# Patient Record
Sex: MALE | Race: WHITE | NOT HISPANIC OR LATINO | URBAN - METROPOLITAN AREA
[De-identification: names, ages, dates, MRNs, and addresses within clinical notes are randomized per-mention and may not be internally consistent; named-entity substitution may affect disease eponyms.]

---

## 2023-01-03 ENCOUNTER — LAB VISIT (OUTPATIENT)
Dept: LAB | Facility: HOSPITAL | Age: 28
End: 2023-01-03
Attending: FAMILY MEDICINE
Payer: COMMERCIAL

## 2023-01-03 ENCOUNTER — OFFICE VISIT (OUTPATIENT)
Dept: INTERNAL MEDICINE | Facility: CLINIC | Age: 28
End: 2023-01-03
Payer: COMMERCIAL

## 2023-01-03 VITALS
HEART RATE: 99 BPM | WEIGHT: 201.5 LBS | OXYGEN SATURATION: 98 % | DIASTOLIC BLOOD PRESSURE: 80 MMHG | TEMPERATURE: 99 F | SYSTOLIC BLOOD PRESSURE: 120 MMHG

## 2023-01-03 DIAGNOSIS — G47.00 INSOMNIA, UNSPECIFIED TYPE: ICD-10-CM

## 2023-01-03 DIAGNOSIS — B00.1 COLD SORE: Primary | ICD-10-CM

## 2023-01-03 DIAGNOSIS — M25.60 JOINT STIFFNESS: ICD-10-CM

## 2023-01-03 DIAGNOSIS — R41.840 CONCENTRATION DEFICIT: ICD-10-CM

## 2023-01-03 DIAGNOSIS — F32.A ANXIETY AND DEPRESSION: ICD-10-CM

## 2023-01-03 DIAGNOSIS — G47.19 EXCESSIVE DAYTIME SLEEPINESS: ICD-10-CM

## 2023-01-03 DIAGNOSIS — R45.89 COMPLAINT OF FEELING DEPRESSED: ICD-10-CM

## 2023-01-03 DIAGNOSIS — F41.9 ANXIETY AND DEPRESSION: ICD-10-CM

## 2023-01-03 DIAGNOSIS — J34.2 ACQUIRED DEVIATED NASAL SEPTUM: ICD-10-CM

## 2023-01-03 LAB
ALBUMIN SERPL BCP-MCNC: 4.3 G/DL (ref 3.5–5.2)
ALP SERPL-CCNC: 37 U/L (ref 55–135)
ALT SERPL W/O P-5'-P-CCNC: 16 U/L (ref 10–44)
ANION GAP SERPL CALC-SCNC: 9 MMOL/L (ref 8–16)
AST SERPL-CCNC: 12 U/L (ref 10–40)
BILIRUB SERPL-MCNC: 0.6 MG/DL (ref 0.1–1)
BUN SERPL-MCNC: 11 MG/DL (ref 6–20)
CALCIUM SERPL-MCNC: 9.7 MG/DL (ref 8.7–10.5)
CHLORIDE SERPL-SCNC: 107 MMOL/L (ref 95–110)
CO2 SERPL-SCNC: 24 MMOL/L (ref 23–29)
CREAT SERPL-MCNC: 0.9 MG/DL (ref 0.5–1.4)
CRP SERPL-MCNC: 1.3 MG/L (ref 0–8.2)
ERYTHROCYTE [SEDIMENTATION RATE] IN BLOOD BY PHOTOMETRIC METHOD: 5 MM/HR (ref 0–23)
EST. GFR  (NO RACE VARIABLE): >60 ML/MIN/1.73 M^2
GLUCOSE SERPL-MCNC: 102 MG/DL (ref 70–110)
POTASSIUM SERPL-SCNC: 4.3 MMOL/L (ref 3.5–5.1)
PROT SERPL-MCNC: 7.4 G/DL (ref 6–8.4)
SODIUM SERPL-SCNC: 140 MMOL/L (ref 136–145)

## 2023-01-03 PROCEDURE — 3074F PR MOST RECENT SYSTOLIC BLOOD PRESSURE < 130 MM HG: ICD-10-PCS | Mod: CPTII,S$GLB,, | Performed by: FAMILY MEDICINE

## 2023-01-03 PROCEDURE — 99204 PR OFFICE/OUTPT VISIT, NEW, LEVL IV, 45-59 MIN: ICD-10-PCS | Mod: S$GLB,,, | Performed by: FAMILY MEDICINE

## 2023-01-03 PROCEDURE — 1159F PR MEDICATION LIST DOCUMENTED IN MEDICAL RECORD: ICD-10-PCS | Mod: CPTII,S$GLB,, | Performed by: FAMILY MEDICINE

## 2023-01-03 PROCEDURE — 84443 ASSAY THYROID STIM HORMONE: CPT | Performed by: FAMILY MEDICINE

## 2023-01-03 PROCEDURE — 3079F DIAST BP 80-89 MM HG: CPT | Mod: CPTII,S$GLB,, | Performed by: FAMILY MEDICINE

## 2023-01-03 PROCEDURE — 3079F PR MOST RECENT DIASTOLIC BLOOD PRESSURE 80-89 MM HG: ICD-10-PCS | Mod: CPTII,S$GLB,, | Performed by: FAMILY MEDICINE

## 2023-01-03 PROCEDURE — 3074F SYST BP LT 130 MM HG: CPT | Mod: CPTII,S$GLB,, | Performed by: FAMILY MEDICINE

## 2023-01-03 PROCEDURE — 99999 PR PBB SHADOW E&M-NEW PATIENT-LVL IV: ICD-10-PCS | Mod: PBBFAC,,, | Performed by: FAMILY MEDICINE

## 2023-01-03 PROCEDURE — 99999 PR PBB SHADOW E&M-NEW PATIENT-LVL IV: CPT | Mod: PBBFAC,,, | Performed by: FAMILY MEDICINE

## 2023-01-03 PROCEDURE — 86038 ANTINUCLEAR ANTIBODIES: CPT | Performed by: FAMILY MEDICINE

## 2023-01-03 PROCEDURE — 85025 COMPLETE CBC W/AUTO DIFF WBC: CPT | Performed by: FAMILY MEDICINE

## 2023-01-03 PROCEDURE — 86140 C-REACTIVE PROTEIN: CPT | Performed by: FAMILY MEDICINE

## 2023-01-03 PROCEDURE — 36415 COLL VENOUS BLD VENIPUNCTURE: CPT | Performed by: FAMILY MEDICINE

## 2023-01-03 PROCEDURE — 1159F MED LIST DOCD IN RCRD: CPT | Mod: CPTII,S$GLB,, | Performed by: FAMILY MEDICINE

## 2023-01-03 PROCEDURE — 99204 OFFICE O/P NEW MOD 45 MIN: CPT | Mod: S$GLB,,, | Performed by: FAMILY MEDICINE

## 2023-01-03 PROCEDURE — 85652 RBC SED RATE AUTOMATED: CPT | Performed by: FAMILY MEDICINE

## 2023-01-03 PROCEDURE — 86431 RHEUMATOID FACTOR QUANT: CPT | Performed by: FAMILY MEDICINE

## 2023-01-03 PROCEDURE — 84439 ASSAY OF FREE THYROXINE: CPT | Performed by: FAMILY MEDICINE

## 2023-01-03 PROCEDURE — 80053 COMPREHEN METABOLIC PANEL: CPT | Performed by: FAMILY MEDICINE

## 2023-01-03 RX ORDER — VALACYCLOVIR HYDROCHLORIDE 1 G/1
1000 TABLET, FILM COATED ORAL 2 TIMES DAILY
Qty: 60 TABLET | Refills: 1 | Status: SHIPPED | OUTPATIENT
Start: 2023-01-03 | End: 2024-03-12 | Stop reason: SDUPTHER

## 2023-01-03 RX ORDER — TRAZODONE HYDROCHLORIDE 100 MG/1
100 TABLET ORAL NIGHTLY
Qty: 90 TABLET | Refills: 1 | Status: SHIPPED | OUTPATIENT
Start: 2023-01-03 | End: 2023-01-25 | Stop reason: DRUGHIGH

## 2023-01-03 RX ORDER — FLUOXETINE 10 MG/1
10 CAPSULE ORAL DAILY
Qty: 90 CAPSULE | Refills: 0 | Status: SHIPPED | OUTPATIENT
Start: 2023-01-03 | End: 2023-01-25 | Stop reason: DRUGHIGH

## 2023-01-03 NOTE — PROGRESS NOTES
"Subjective:      Patient ID: Jose Patterson is a 27 y.o. male.    Chief Complaint: Establish Care    Has not seen a PCP in a few years.     Pt complains of frequent cold sores.  He has never taken any medications before.  He has flares with weather changes. Feels self conscious with the medication.  Having at least 12 episodes a year.     Pt had traumatic injury to his nose in high school when he got hit with a baseball.  Having nasal congestion and problems breathing at night. Snoring at night.   He has never seen an ENT for it.  He has tried OTC allergy medications without relief.     Pt having problems falling asleep.  Will also wake up in the night and can't go back to sleep.  Random triggers will wake him. Having racing thoughts at night. Has tried melatonin without relief. Has tried Zquil and valerian root without relief.     Pt concerned about ADHD.  Having problems concentrating. Will have to reread things.  Needs lists to remember issues.  Works as  at [employer]. Pt had issues in school with having problems concentrating. Was able to cope somewhat because both parents were teachers. Was always late turning things in. Got in trouble for not paying attention in class.   -will get hyperfocused on tasks  -also having problems with depression  -pt does have mood fluctations but no prolonged symptoms  -having dreams of walking into gas cloud at work which will make him wake up.     Wife works in crime lab. Vonda wife    Was in . Was in [place]. "Saw a lot of stuff"   Occasional flashbacks. [employer].   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   Has an appt with therapist on Friday.     The patient's Health Maintenance was reviewed and the following appears to be due at this time:   Health Maintenance Due   Topic Date Due    Hepatitis C Screening  Never done    Lipid Panel  Never done    Pneumococcal Vaccines (Age 0-64) (1 - PCV) Never done    HIV Screening  Never done    TETANUS VACCINE  " Never done    COVID-19 Vaccine (3 - Booster for Pfizer series) 11/10/2021    Influenza Vaccine (1) 09/01/2022        Past Medical History:  History reviewed. No pertinent past medical history.  Past Surgical History:   Procedure Laterality Date    TONSILLECTOMY      About 20 years ago?     Review of patient's allergies indicates:  No Known Allergies  Social History     Socioeconomic History    Marital status:      Spouse name: Vonda   Tobacco Use    Smoking status: Some Days     Types: Vaping with nicotine    Smokeless tobacco: Never   Substance and Sexual Activity    Alcohol use: Yes     Alcohol/week: 5.0 standard drinks     Types: 5 Drinks containing 0.5 oz of alcohol per week    Drug use: Never    Sexual activity: Yes     Partners: Female     Birth control/protection: I.U.D.     Family History   Problem Relation Age of Onset    Diabetes Paternal Grandfather         Type 2    Cancer Paternal Grandmother         Lung and breast       Review of Systems   Constitutional:  Positive for fatigue. Negative for activity change and unexpected weight change.   HENT:  Negative for hearing loss, rhinorrhea and trouble swallowing.    Eyes:  Negative for discharge and visual disturbance.   Respiratory:  Negative for chest tightness and wheezing.    Cardiovascular:  Negative for chest pain and palpitations.   Gastrointestinal:  Negative for blood in stool, constipation, diarrhea and vomiting.   Endocrine: Negative for polydipsia and polyuria.   Genitourinary:  Negative for difficulty urinating, hematuria and urgency.   Musculoskeletal:  Positive for arthralgias. Negative for joint swelling and neck pain.   Neurological:  Negative for weakness and headaches.   Psychiatric/Behavioral:  Positive for dysphoric mood and sleep disturbance. Negative for confusion.       Objective:   /80   Pulse 99   Temp 98.7 °F (37.1 °C) (Tympanic)   Wt 91.4 kg (201 lb 8 oz)   SpO2 98%     PHQ-9 Questionnaire  Little interest or  pleasure in doing things: Several days  Feeling down, depressed, or hopeless: More than half the days  Trouble falling or staying asleep, or sleeping too much: Nearly every day  Feeling tired or having little energy: Nearly every day  Poor appetite or overeating: Nearly every day  Feeling bad about yourself - or that you are a failure or have let yourself or your family down: Nearly every day  Trouble concentrating on things, such as reading the newspaper or watching television: Several days  Moving or speaking so slowly that other people could have noticed? Or the opposite - being so fidgety or restless that you have been moving around a lot more than usual.: More than half the days  Thoughts that you would be better off dead or hurting yourself in some way: More than half the days  Patient Health Questionnaire-9 Score: 20    How difficult have these problems made it for you to do your work, take care of things at home, or get along with other people?: Extremely difficult    ALEX-7 Questionnaire  ALEX-7 Questionnaire  Feeling nervous, anxious, or on edge: Nearly every day  Not being able to stop or control worrying: Nearly every day  Worrying too much about different things: Nearly every day  Trouble relaxing: Nearly every day  Being so restless that it is hard to sit still: Nearly every day  Becoming easily annoyed or irritable: Nearly every day  Feeling afraid as if something awful might happen: Nearly every day  ALEX-7 Total Score: 21         Mood Disorder Questionnaire  Results of the Mood Disorder Questionnaire 1/3/2023   you felt so good or so hyper that other people thought you were not your normal self or you were so hyper that you got into trouble? 1   you were so irritable that you shouted at people or started fights or arguments? 1   you felt much more self-confident than usual? 1   you got much less sleep than usual and found that you didn't really miss it? 0   you were more talkative or spoke much faster  than usual? 1   thoughts raced through your head or you couldn't slow your mind down? 1   you were so easily distracted by things around you that you had trouble concentrating or staying on track? 1   you had more energy than usual? 1   you were much more active or did many more things than usual? 1   you were much more social or outgoing than usual, for example, you telephoned friends in the middle of the night? 1   you were much more interested in sex than usual? 1   you did things that were unusual for you or that other people might have thought were excessive, foolish, or risky? 1   spending money got you or your family in trouble? 1   If you checked YES to more than one of the above, have several of these ever happened during the same period of time? 1   How much of a problem did any of these cause you - like being unable to work; having family, money or legal troubles; getting into arguments or fights? Serious problem   Mood Disorder Questionnaire Score  12     Adult ADHD Self-Report Scale 1/3/2023   How often do you have trouble wrapping up the final details of a project once the chanllenging parts have been done? 4   How often do you have difficulty getting things in order when you have to do a task that requires organization? 4   How often do you have problems remembering appointments or obligations? 4   When you have a task that requires a lot of thought, how often do you avoid or delay getting started? 4   How often do you fidget or squirm with your hands or feet when you have to sit down for a long time? 4   How often do you feel overly active and compelled to do things, like you were driven by a motor? 4   Part A Score 24   How often do you make careless mistakes when you have to work on a boring or difficult project? 3   How often do you have difficulty keeping your attention when you are doing boring or repetitive work? 4   How often do you have difficulty concentrating on what people say to you, even  when they are speaking to you directly? 4   How often do you misplace or have difficulty finding things at home or at work? 4   How often are you distracted by activity or noise around you? 4   How often do you leave your seat in meetings or other situations in which you are expected to remain seated? 3   How often do you feel restless or fidgety? 3   How often do you have difficulty unwinding and relaxing when you have time to yourself? 3   How often do you find yourself talking too much when you are in social situations? 2   When you're in a conversation, how often do you find yourself finishing the sentences of the people you are talking to before they can finish them themselves? 3   How often do you have difficulty waiting your turn in situations when turn taking is required? 3   How often do you interrupt others when they are busy? 4   Part B Score 40      Physical Exam  Vitals and nursing note reviewed.   Constitutional:       Appearance: Normal appearance.   HENT:      Head: Normocephalic.      Nose:      Comments: Curved septum  Eyes:      Conjunctiva/sclera: Conjunctivae normal.   Cardiovascular:      Rate and Rhythm: Normal rate and regular rhythm.   Pulmonary:      Effort: Pulmonary effort is normal.      Breath sounds: Normal breath sounds.   Abdominal:      General: Abdomen is flat. There is no distension.      Palpations: Abdomen is soft.   Musculoskeletal:      Cervical back: Normal range of motion and neck supple.      Right lower leg: No edema.      Left lower leg: No edema.   Skin:     General: Skin is warm and dry.   Neurological:      Mental Status: He is alert and oriented to person, place, and time. Mental status is at baseline.   Psychiatric:         Mood and Affect: Mood normal.         Behavior: Behavior normal.       1. Cold sore    2. Excessive daytime sleepiness    3. Concentration deficit    4. Joint stiffness    5. Acquired deviated nasal septum    6. Anxiety and depression    7.  Insomnia, unspecified type      Plan:       1. Cold sore  Comments:  discussed episode based therapy vs prophylactic; pt wants PRN tx at this time, Rx sent to pharmacy  Orders:  -     valACYclovir (VALTREX) 1000 MG tablet; Take 1 tablet (1,000 mg total) by mouth 2 (two) times daily. Take for 2 days when having an outbreak  Dispense: 60 tablet; Refill: 1    2. Excessive daytime sleepiness  Comments:  ordering sleep study; will follow up with results  Orders:  -     Polysomnogram (CPAP will be added if patient meets diagnostic criteria.); Future    3. Concentration deficit  Comments:  screeners done in office, potential component of ADHD but does have significant ALEX/MDD, will start treatment for these and then reevaluate    4. Joint stiffness  Comments:  unclear etiology, checking labs and will follow up with results  Orders:  -     Sedimentation rate; Future; Expected date: 01/03/2023  -     C-REACTIVE PROTEIN; Future; Expected date: 01/03/2023  -     RHEUMATOID FACTOR; Future; Expected date: 01/03/2023  -     NAVEEN; Future; Expected date: 01/03/2023    5. Acquired deviated nasal septum  Comments:  referring to ENT  Orders:  -     Ambulatory referral/consult to ENT; Future; Expected date: 01/10/2023    6. Anxiety and depression  Comments:  keep upcoming appt with counselor, will start pt on Prozac, no SI/HI  Orders:  -     CBC Auto Differential; Future; Expected date: 01/03/2023  -     Comprehensive Metabolic Panel; Future; Expected date: 01/03/2023  -     T4, Free; Future; Expected date: 01/03/2023  -     TSH; Future; Expected date: 01/03/2023  -     FLUoxetine 10 MG capsule; Take 1 capsule (10 mg total) by mouth once daily.  Dispense: 90 capsule; Refill: 0    7. Insomnia, unspecified type  Comments:  denies any flashbacks, working to control underlying mood; discussed sleep hygeine, trial of trazodone while prozac is titrating up; referring for sleep study    Other orders  -     traZODone (DESYREL) 100 MG tablet;  Take 1 tablet (100 mg total) by mouth every evening.  Dispense: 90 tablet; Refill: 1                      Follow up in about 3 weeks (around 1/24/2023) for F/U anxiety, telehealth .

## 2023-01-03 NOTE — PATIENT INSTRUCTIONS
Starting Prozac:  1st 2 weeks, take one capsule in the morning   Can increase to 2 capsules after that  You might have nausea and loose stools but these will improve with taking it on a daily basis  It can take 6-8 weeks to have a full effect, my goal would be to get you up to 40mg dose   Starting trazodone  Start with half a tab at night, can increase to full tab or 1 1/2 tabs if needed    ...........................................................................................................................    Tips for Falling Asleep Faster  Your bedtime is ________ A.M. / P.M.    Do not use your bed for anything except sleep; that is, do not read, watch TV, eat, or worry in bed.    If you find yourself unable to fall asleep within about 15 minutes, get up and go into another part of the room.  Since we do not want you to watch the clock, just estimate how long you have been lying awake.  Remember, the goal is to help train your body to learn that your bed means falling asleep quickly!  If you are in bed for more than about 15 minutes without falling asleep and have not gotten up, you are not following this instruction.  Return to bed to go to sleep only when you are very sleepy.    While out of bed during the night, you can do quiet but boring activities, like reading, organizing your desk, folding laundry, etc. Do not exercise or take warm showers or baths.  Do not lie down or fall asleep when not in bed.  Do not have a TV in your room.    If you do return to bed and still cannot fall asleep within 15 minutes, repeat Step 3.  Do this as often as you need to throughout the night.     Set your alarm and get up at the same time every morning no matter how much sleep you got during the night.  This will help your body get into a consistent sleep pattern.      Your wake time is _________ A.M.    Do not nap during the day.  This will help make it easier for you to fall asleep at your bedtime.

## 2023-01-04 ENCOUNTER — PATIENT MESSAGE (OUTPATIENT)
Dept: INTERNAL MEDICINE | Facility: CLINIC | Age: 28
End: 2023-01-04
Payer: COMMERCIAL

## 2023-01-04 LAB
ANA SER QL IF: NORMAL
BASOPHILS # BLD AUTO: 0.05 K/UL (ref 0–0.2)
BASOPHILS NFR BLD: 0.7 % (ref 0–1.9)
DIFFERENTIAL METHOD: ABNORMAL
EOSINOPHIL # BLD AUTO: 0.2 K/UL (ref 0–0.5)
EOSINOPHIL NFR BLD: 2.3 % (ref 0–8)
ERYTHROCYTE [DISTWIDTH] IN BLOOD BY AUTOMATED COUNT: 12.1 % (ref 11.5–14.5)
HCT VFR BLD AUTO: 43.3 % (ref 40–54)
HGB BLD-MCNC: 14.1 G/DL (ref 14–18)
IMM GRANULOCYTES # BLD AUTO: 0.02 K/UL (ref 0–0.04)
IMM GRANULOCYTES NFR BLD AUTO: 0.3 % (ref 0–0.5)
LYMPHOCYTES # BLD AUTO: 1.2 K/UL (ref 1–4.8)
LYMPHOCYTES NFR BLD: 17.3 % (ref 18–48)
MCH RBC QN AUTO: 29.7 PG (ref 27–31)
MCHC RBC AUTO-ENTMCNC: 32.6 G/DL (ref 32–36)
MCV RBC AUTO: 91 FL (ref 82–98)
MONOCYTES # BLD AUTO: 0.5 K/UL (ref 0.3–1)
MONOCYTES NFR BLD: 7.4 % (ref 4–15)
NEUTROPHILS # BLD AUTO: 5 K/UL (ref 1.8–7.7)
NEUTROPHILS NFR BLD: 72 % (ref 38–73)
NRBC BLD-RTO: 0 /100 WBC
PLATELET # BLD AUTO: 282 K/UL (ref 150–450)
PMV BLD AUTO: 11.8 FL (ref 9.2–12.9)
RBC # BLD AUTO: 4.74 M/UL (ref 4.6–6.2)
RHEUMATOID FACT SERPL-ACNC: <13 IU/ML (ref 0–15)
T4 FREE SERPL-MCNC: 1.02 NG/DL (ref 0.71–1.51)
TSH SERPL DL<=0.005 MIU/L-ACNC: 1.75 UIU/ML (ref 0.4–4)
WBC # BLD AUTO: 6.92 K/UL (ref 3.9–12.7)

## 2023-01-06 PROBLEM — B00.1 COLD SORE: Status: ACTIVE | Noted: 2023-01-06

## 2023-01-06 PROBLEM — G47.00 INSOMNIA: Status: ACTIVE | Noted: 2023-01-06

## 2023-01-06 PROBLEM — R41.840 CONCENTRATION DEFICIT: Status: ACTIVE | Noted: 2023-01-06

## 2023-01-06 PROBLEM — F32.A ANXIETY AND DEPRESSION: Status: ACTIVE | Noted: 2023-01-06

## 2023-01-06 PROBLEM — F41.9 ANXIETY AND DEPRESSION: Status: ACTIVE | Noted: 2023-01-06

## 2023-01-11 ENCOUNTER — OFFICE VISIT (OUTPATIENT)
Dept: OTOLARYNGOLOGY | Facility: CLINIC | Age: 28
End: 2023-01-11
Payer: COMMERCIAL

## 2023-01-11 VITALS
DIASTOLIC BLOOD PRESSURE: 88 MMHG | WEIGHT: 199.94 LBS | TEMPERATURE: 98 F | SYSTOLIC BLOOD PRESSURE: 150 MMHG | HEART RATE: 106 BPM

## 2023-01-11 DIAGNOSIS — J34.2 ACQUIRED DEVIATED NASAL SEPTUM: ICD-10-CM

## 2023-01-11 DIAGNOSIS — J34.2 NASAL SEPTAL DEVIATION: Primary | ICD-10-CM

## 2023-01-11 DIAGNOSIS — J34.3 HYPERTROPHY OF INFERIOR NASAL TURBINATE: ICD-10-CM

## 2023-01-11 DIAGNOSIS — J30.89 NON-SEASONAL ALLERGIC RHINITIS, UNSPECIFIED TRIGGER: ICD-10-CM

## 2023-01-11 DIAGNOSIS — R06.83 SNORING: ICD-10-CM

## 2023-01-11 PROCEDURE — 31231 NASAL ENDOSCOPY DX: CPT | Mod: S$GLB,,, | Performed by: OTOLARYNGOLOGY

## 2023-01-11 PROCEDURE — 3079F PR MOST RECENT DIASTOLIC BLOOD PRESSURE 80-89 MM HG: ICD-10-PCS | Mod: CPTII,S$GLB,, | Performed by: OTOLARYNGOLOGY

## 2023-01-11 PROCEDURE — 3077F PR MOST RECENT SYSTOLIC BLOOD PRESSURE >= 140 MM HG: ICD-10-PCS | Mod: CPTII,S$GLB,, | Performed by: OTOLARYNGOLOGY

## 2023-01-11 PROCEDURE — 3077F SYST BP >= 140 MM HG: CPT | Mod: CPTII,S$GLB,, | Performed by: OTOLARYNGOLOGY

## 2023-01-11 PROCEDURE — 1159F MED LIST DOCD IN RCRD: CPT | Mod: CPTII,S$GLB,, | Performed by: OTOLARYNGOLOGY

## 2023-01-11 PROCEDURE — 99204 OFFICE O/P NEW MOD 45 MIN: CPT | Mod: 25,S$GLB,, | Performed by: OTOLARYNGOLOGY

## 2023-01-11 PROCEDURE — 99999 PR PBB SHADOW E&M-EST. PATIENT-LVL IV: CPT | Mod: PBBFAC,,, | Performed by: OTOLARYNGOLOGY

## 2023-01-11 PROCEDURE — 31231 PR NASAL ENDOSCOPY, DX: ICD-10-PCS | Mod: S$GLB,,, | Performed by: OTOLARYNGOLOGY

## 2023-01-11 PROCEDURE — 3079F DIAST BP 80-89 MM HG: CPT | Mod: CPTII,S$GLB,, | Performed by: OTOLARYNGOLOGY

## 2023-01-11 PROCEDURE — 99204 PR OFFICE/OUTPT VISIT, NEW, LEVL IV, 45-59 MIN: ICD-10-PCS | Mod: 25,S$GLB,, | Performed by: OTOLARYNGOLOGY

## 2023-01-11 PROCEDURE — 99999 PR PBB SHADOW E&M-EST. PATIENT-LVL IV: ICD-10-PCS | Mod: PBBFAC,,, | Performed by: OTOLARYNGOLOGY

## 2023-01-11 PROCEDURE — 1159F PR MEDICATION LIST DOCUMENTED IN MEDICAL RECORD: ICD-10-PCS | Mod: CPTII,S$GLB,, | Performed by: OTOLARYNGOLOGY

## 2023-01-11 NOTE — PROGRESS NOTES
Referring Provider:    Chaparrita Kitchen Md  [address] [facility]  [place]  [place] [address]  Subjective:   Patient: Jose Patterson [id number], :1995   Visit date:2023 2:29 PM    Chief Complaint:  deviated septum (Pt states was hit in face with baseball in . C/o congestion bilaterally delmy at night. Wife hears him gasp for air while sleeping)    HPI:    Prior notes reviewed by myself.  Clinical documentation obtained by nursing staff reviewed.     27-year-old gentleman presents for evaluation of nasal obstruction, right greater than left.  This has been an issue since  at which time he had blunt nasal trauma from a baseball.  He has tried topical nasal steroid sprays for up to 6 weeks at a time without significant relief.  He has also tried saline nasal spray for 6-12 weeks without significant relief.  He reports loud snoring with witnessed apneic episodes and daytime hypersomnolence.  No history of recurrent acute sinusitis.      Objective:     Physical Exam:  Vitals:  BP (!) 150/88   Pulse 106   Temp 98.2 °F (36.8 °C) (Temporal)   Wt 90.7 kg (199 lb 15.3 oz)   General appearance:  Well developed, well nourished    Ears:  Otoscopy of external auditory canals and tympanic membranes was normal, clinical speech reception thresholds grossly intact, no mass/lesion of auricle.    Nose:  No masses/lesions of external nose, nasal mucosa, septum 3+ deviation to the right with 75% obstruction, and turbinates were 3+ hypertrophied, bony and not responsive to decongestant.    Mouth:  No mass/lesion of lips, teeth, gums, hard/soft palate, tongue, tonsils, or oropharynx.    Neck & Lymphatics:  No cervical lymphadenopathy, no neck mass/crepitus/ asymmetry, trachea is midline, no thyroid enlargement/tenderness/mass.        [x]  Data Reviewed:    Lab Results   Component Value Date    WBC 6.92 2023    HGB 14.1 2023    HCT 43.3 2023    MCV 91 2023    EOSINOPHIL 2.3 2023     PROCEDURE NOTE:   Diagnostic nasal endoscopy  Preprocedure diagnosis:  nasal obstruction  Postprocedure diangosis:  Same  Complications:  None  Blood Loss:  None    Procedure in detail:  After verbal consent was obtained, the patient's nasal cavity was anesthesized using topical Tetracaine and Neosynepherine.  A rigid 30 degree endoscope was placed in first the right, then the left nasal cavity.  The patient was noted to have a significant rightward nasal septal deviation with 75% obstruction of his nasal cavity.  He was noted to have hypertrophied inferior turbinates which contributed to the obstruction and did not decrease in size with decongestant.  No purulent drainage or masses seen.  The patient tolerated the procedure well and there were no complications.        Assessment & Plan:   Nasal septal deviation  -     Case Request Operating Room: SEPTOPLASTY, NOSE, WITH NASAL TURBINATE REDUCTION    Acquired deviated nasal septum  Comments:  referring to ENT  Orders:  -     Ambulatory referral/consult to ENT    Hypertrophy of inferior nasal turbinate    Non-seasonal allergic rhinitis, unspecified trigger    Snoring  -     Home Sleep Study; Future        He has significant nasal obstruction right greater than left.  This is due to his nasal septal deviation and turbinate hypertrophy.  He also has a very convincing history for obstructive sleep apnea.  We had a long discussion about surgical options including septoplasty and bilateral submucous resection of the inferior turbinates.  All risks, benefits and alternatives were explained in detail and his written and verbal informed consent were obtained.  He would like to proceed with the surgery in March.    He we also agreed to move forward with a home sleep study to evaluate him for obstructive sleep apnea.

## 2023-01-12 ENCOUNTER — TELEPHONE (OUTPATIENT)
Dept: SLEEP MEDICINE | Facility: CLINIC | Age: 28
End: 2023-01-12
Payer: COMMERCIAL

## 2023-01-25 ENCOUNTER — TELEPHONE (OUTPATIENT)
Dept: INTERNAL MEDICINE | Facility: CLINIC | Age: 28
End: 2023-01-25
Payer: COMMERCIAL

## 2023-01-25 ENCOUNTER — OFFICE VISIT (OUTPATIENT)
Dept: INTERNAL MEDICINE | Facility: CLINIC | Age: 28
End: 2023-01-25
Payer: COMMERCIAL

## 2023-01-25 ENCOUNTER — PATIENT MESSAGE (OUTPATIENT)
Dept: INTERNAL MEDICINE | Facility: CLINIC | Age: 28
End: 2023-01-25

## 2023-01-25 ENCOUNTER — PATIENT MESSAGE (OUTPATIENT)
Dept: INTERNAL MEDICINE | Facility: CLINIC | Age: 28
End: 2023-01-25
Payer: COMMERCIAL

## 2023-01-25 DIAGNOSIS — R41.840 CONCENTRATION DEFICIT: ICD-10-CM

## 2023-01-25 DIAGNOSIS — R41.840 CONCENTRATION DEFICIT: Primary | ICD-10-CM

## 2023-01-25 DIAGNOSIS — G47.00 INSOMNIA, UNSPECIFIED TYPE: ICD-10-CM

## 2023-01-25 DIAGNOSIS — F32.A ANXIETY AND DEPRESSION: Primary | ICD-10-CM

## 2023-01-25 DIAGNOSIS — F41.9 ANXIETY AND DEPRESSION: Primary | ICD-10-CM

## 2023-01-25 PROCEDURE — 99213 PR OFFICE/OUTPT VISIT, EST, LEVL III, 20-29 MIN: ICD-10-PCS | Mod: 95,,, | Performed by: FAMILY MEDICINE

## 2023-01-25 PROCEDURE — 99213 OFFICE O/P EST LOW 20 MIN: CPT | Mod: 95,,, | Performed by: FAMILY MEDICINE

## 2023-01-25 RX ORDER — DEXTROAMPHETAMINE SACCHARATE, AMPHETAMINE ASPARTATE MONOHYDRATE, DEXTROAMPHETAMINE SULFATE AND AMPHETAMINE SULFATE 3.75; 3.75; 3.75; 3.75 MG/1; MG/1; MG/1; MG/1
15 CAPSULE, EXTENDED RELEASE ORAL DAILY
Qty: 30 CAPSULE | Refills: 0 | Status: SHIPPED | OUTPATIENT
Start: 2023-01-25 | End: 2023-02-01 | Stop reason: SDUPTHER

## 2023-01-25 RX ORDER — FLUOXETINE HYDROCHLORIDE 20 MG/1
20 CAPSULE ORAL DAILY
Qty: 90 CAPSULE | Refills: 0 | Status: SHIPPED | OUTPATIENT
Start: 2023-01-25 | End: 2023-03-20 | Stop reason: SDUPTHER

## 2023-01-25 RX ORDER — TRAZODONE HYDROCHLORIDE 150 MG/1
150 TABLET ORAL NIGHTLY
Qty: 90 TABLET | Refills: 1 | Status: SHIPPED | OUTPATIENT
Start: 2023-01-25 | End: 2023-08-01 | Stop reason: SDUPTHER

## 2023-01-25 RX ORDER — METHYLPHENIDATE HYDROCHLORIDE 27 MG/1
27 TABLET ORAL EVERY MORNING
Qty: 30 TABLET | Refills: 0 | Status: SHIPPED | OUTPATIENT
Start: 2023-01-25 | End: 2023-01-25 | Stop reason: RX

## 2023-01-25 NOTE — PROGRESS NOTES
"The patient location is: [place]  The chief complaint leading to consultation is: f/u on medication changes    Visit type: audiovisual    Face to Face time with patient: 8:17-8:32  20 minutes of total time spent on the encounter, which includes face to face time and non-face to face time preparing to see the patient (eg, review of tests), Obtaining and/or reviewing separately obtained history, Documenting clinical information in the electronic or other health record, Independently interpreting results (not separately reported) and communicating results to the patient/family/caregiver, or Care coordination (not separately reported).         Each patient to whom he or she provides medical services by telemedicine is:  (1) informed of the relationship between the physician and patient and the respective role of any other health care provider with respect to management of the patient; and (2) notified that he or she may decline to receive medical services by telemedicine and may withdraw from such care at any time.    Notes:      Subjective:     Sleeping better with trazodone. Taking 50mg dose.  Sometimes taking 1.5 mg     Started on Prozac on the last visit. Taking 20mg.  Feels like things have "been good in general". Not spiraling over little things  Notices a lot more energy   Had a stressful week at work last week and handled it much better. He was able to rationally think through     Noticed that ADHD is getting worse.  Having more problems focusing on conversations. Spacing out more.   Wife has noticed problems with paying attention.   Has never tried any medications before.   No risk spending or behaviors.   Training is almost done at work. There is a freeze on promotions in February 20th. Has been trying to study for things at the same time.  Passed section test yesterday.     Objective:   General: no apparent distress, seen on video chat  Respiratory: no coughing, able to talk in complete sentences   Neuro: " no focal deficits appreciated on video; AAOx3  Psych: happy affect, no pressured speech; good insight, no SI/HI     Assessment/Plan:   1. Anxiety and depression  Comments:  doing well with prozac, will continue the 20mg dose  Overview:  Jan 2023--started on Prozac; notes an improvement   Working with counselor     Orders:  -     FLUoxetine 20 MG capsule; Take 1 capsule (20 mg total) by mouth once daily.  Dispense: 90 capsule; Refill: 0    2. Concentration deficit  Comments:  no improvement of concentration with improvement of mood; will try pt on stimulant; discussed risks associated with misuse/abuse  Overview:  screeners done in office, potential component of ADHD but does have significant ALEX/MDD, will start treatment for these and then reevaluate    Orders:  -     Discontinue: methylphenidate HCl 27 MG CR tablet; Take 1 tablet (27 mg total) by mouth every morning.  Dispense: 30 tablet; Refill: 0    3. Insomnia, unspecified type  Comments:  will icnrease trazodone to 150mg tab, discussed sleep hygeine   Overview:  denies any flashbacks, working to control underlying mood  Jan 2023--doing well with  trazodone    Orders:  -     traZODone (DESYREL) 150 MG tablet; Take 1 tablet (150 mg total) by mouth every evening.  Dispense: 90 tablet; Refill: 1

## 2023-01-25 NOTE — TELEPHONE ENCOUNTER
Called pt to inform him that  was in room with patient and will be on virtual call with him shortly. //DM

## 2023-02-01 ENCOUNTER — PATIENT MESSAGE (OUTPATIENT)
Dept: INTERNAL MEDICINE | Facility: CLINIC | Age: 28
End: 2023-02-01
Payer: COMMERCIAL

## 2023-02-01 RX ORDER — DEXTROAMPHETAMINE SACCHARATE, AMPHETAMINE ASPARTATE MONOHYDRATE, DEXTROAMPHETAMINE SULFATE AND AMPHETAMINE SULFATE 3.75; 3.75; 3.75; 3.75 MG/1; MG/1; MG/1; MG/1
15 CAPSULE, EXTENDED RELEASE ORAL DAILY
Qty: 30 CAPSULE | Refills: 0 | Status: SHIPPED | OUTPATIENT
Start: 2023-02-01 | End: 2023-05-18

## 2023-02-16 ENCOUNTER — PATIENT MESSAGE (OUTPATIENT)
Dept: INTERNAL MEDICINE | Facility: CLINIC | Age: 28
End: 2023-02-16
Payer: COMMERCIAL

## 2023-02-27 ENCOUNTER — HOSPITAL ENCOUNTER (OUTPATIENT)
Dept: SLEEP MEDICINE | Facility: HOSPITAL | Age: 28
Discharge: HOME | End: 2023-02-27
Attending: OTOLARYNGOLOGY
Payer: COMMERCIAL

## 2023-02-27 DIAGNOSIS — R06.83 SNORING: ICD-10-CM

## 2023-02-27 DIAGNOSIS — G47.33 OSA (OBSTRUCTIVE SLEEP APNEA): Primary | ICD-10-CM

## 2023-02-27 PROCEDURE — 95800 SLP STDY UNATTENDED: CPT | Mod: 26,,, | Performed by: INTERNAL MEDICINE

## 2023-02-27 PROCEDURE — 95800 PR SLEEP STUDY, UNATTENDED, RECORD HEART RATE/O2 SAT/RESP ANAL/SLEEP TIME: ICD-10-PCS | Mod: 26,,, | Performed by: INTERNAL MEDICINE

## 2023-02-27 PROCEDURE — 95806 SLEEP STUDY UNATT&RESP EFFT: CPT | Performed by: INTERNAL MEDICINE

## 2023-02-27 NOTE — Clinical Note
PHYSICIAN INTERPRETATION AND COMMENTS: Findings are consistent with mild, positional obstructive sleep apnea(DANIEL). Therapy with CPAP indicated, Please refer to sleep disorders clinic CLINICAL HISTORY: 28 year old male presented with: 14.5 inch neck, BMI of 27.9, an [place] sleepiness score of 10, history of depression, daily use of supplemental oxygen and symptoms of nocturnal snoring, waking up choking and witnessed apneas. Based on the clinical history, the patient has a high pre-test probability of having Moderate DANIEL.

## 2023-02-27 NOTE — PROGRESS NOTES
Pre op instructions reviewed with patient per phone.      To confirm, your doctor has instructed you: Surgery is scheduled for 3/10/2023.     Pre admit office will call the afternoon prior to surgery between 1PM and 3PM with arrival time.    Surgery will be at [facility],  The address is [address]. [place]  [address].      IMPORTANT INSTRUCTIONS!    Do not eat or drink after 12 midnight, including water. Do not smoke or use chewing tobacco after 12 midnight  OK to brush teeth, but no gum, candy, or mints!      *Take only these medicines with a small swallow of water-morning of surgery*     Fluoxetine        ____ Stop Aspirin, Ibuprofen, Motrin and Aleve at least 5-7 days before surgery, unless otherwise instructed by your doctor, or the nurse.   You MAY use Tylenol/acetaminophen until day of surgery.      ____  If you take diabetic medication, do NOT take morning of surgery unless instructed by Doctor. Metformin must be stopped 24 hrs prior to surgery time.       ____ Stop taking any Fish Oil supplements or Vitamins at least 5 days prior to surgery, unless instructed otherwise by your Doctor.       Please notify MD office if you have an active infection, currently taking antibiotics or received a vaccination within the past 7 days.      Bathing Instructions: The night before surgery and the morning prior to coming to the hospital:    - Shower & rinse your body as usual with anti-bacterial Soap (Dial or Lever 2000)   -Hibiclens (if indicated) use AFTER anti-bacterial soap; 1 packet PM/1 packet in AM on surgical site only   -Do not use hibiclens on your head, face, or genitals.    -Do not wash with anti-bacterial soap after you use the hibiclens.    -Do not shave surgical site 5-7 days prior to surgery.    -Pubic hair 7 days prior to surgery (gyn pt's).      Pediatric patients do not need to use anti-bacterial soap or Hibiclens.             After Bathing:   __ No powder, lotions, creams, or  body spray to skin     __No deodorant for any breast procedure, PORT, or upper arm surgery     __ No makeup, mascara, nail polish or artificial nails       **SURGERY WILL BE CANCELLED IF ARTIFICIAL/NAIL POLISH IS PRESENT!!!**    __ Please remove all piercings and leave all jewelry at home.    **SURGERY WILL BE CANCELLED IF PIERCINGS ARE PRESENT!!!**      __ Dentures, Hearing Aids and Contact Lens need to be removed prior to the start of surgery.      __ Wear clean, loose-fitting clothing. Allow for dressings/bandages/surgical equipment     __ You must have transportation, and they MUST stay the entire time.         [facility] Visitor/Ride Policy:   Only 1 adult allowed (over the age of 18) to accompany you into Pre-op/Recovery Surgery Dept and must stay through the entire length of admission.     Must have a ride home from a responsible adult that you know and trust.      Pediatric Patients are allowed 2 adult visitors.     Medical Transport, Uber or Lyft can only be used if patient has a responsible adult to accompany them during ride home.           Post-Op Instructions: You will receive surgery post-op instructions by your Discharge Nurse prior to going home.     Surgical Site Infection:   Prevention of surgical site infections:   -Keep incisions clean and dry.   -Do not soak/submerge incisions in water until completely healed.   -Do not apply lotions, powders, creams, or deodorants to site.   -Always make sure hands are cleaned with antibacterial soap/ alcohol-based   prior to touching the surgical site.       Signs and symptoms:               -Redness and pain around the area where you had surgery               -Drainage of cloudy fluid from your surgical wound               -Fever over 100.4 or chills     >>>Call Surgeon office/on-call Surgeon if you experience any of these signs & symptoms post-surgery @ [phone].       *Please Call [facility] Pre-Admit Department for surgery instruction  questions:  [phone]    *Payment questions:  [phone]    *Billing questions:  [phone]

## 2023-02-28 PROBLEM — R06.83 SNORING: Status: ACTIVE | Noted: 2023-02-28

## 2023-02-28 NOTE — PROCEDURES
PHYSICIAN INTERPRETATION AND COMMENTS: Findings are consistent with mild, positional obstructive sleep apnea(DANIEL).  Therapy with CPAP indicated, Please refer to sleep disorders clinic  CLINICAL HISTORY: 28 year old male presented with: 14.5 inch neck, BMI of 27.9, an Astatula sleepiness score of 10, history  of depression, daily use of supplemental oxygen and symptoms of nocturnal snoring, waking up choking and witnessed  apneas. Based on the clinical history, the patient has a high pre-test probability of having Moderate DANIEL.  SLEEP STUDY FINDINGS: Patient underwent a 1 night Home Sleep Test and by behavioral criteria, slept for approximately  6.68 hours, with a sleep latency of 6 minutes and a sleep efficiency of 97%. Mild sleep disordered breathing (AHI=13) is  noted based on a 4% hypopnea desaturation criteria, predominantly in the supine position (21 events/hour). The patient  slept supine 46.5% of the night based on valid recording time of 6.24 hours and is 3 times as likely to have  apneas/hypopneas when supine. When considering more subtle measures of sleep disordered breathing, the overall  respiratory disturbance index is mild(RDI=17) based on a 1% hypopnea desaturation criteria with confirmation by  surrogate arousal indicators. The apneas/hypopneas are accompanied by minimal oxygen desaturation (percent time  below 90% SpO2: 1%, Min SpO2: 82.3%, Baseline SpO2: 99%). The average desaturation across all sleep disordered  breathing events is 3.9%. Snoring occurs for 17.4% (30 dB) of the study, 5% is very loud. The mean pulse rate is 61 BPM, with  very frequent pulse rate variability (80 events with >= 6 BPM increase/decrease per hour).  TREATMENT CONSIDERATIONS: For symptomatic mild obstructive sleep apnea, patient preference and compliance  impacts efficacious outcomes. Consider treatment with nasal continuous positive airway pressure (CPAP/AutoPAP),  mandibular advancement splint (MAS), referral to an ENT  "surgeon for modification to the airway, and/or weight loss or  behavioral therapy. Based on the DANIEL Supine data in the study, a Mandibular Advancement Splint (MAS) will likely provide  treatment benefit independent of DANIEL severity. The patient should avoid sleeping supine; the non-supine AHI is 3 times  less severe than the supine AHI.  DISEASE MANAGEMENT CONSIDERATIONS: Based on the SpO2, further evaluation by awake arterial blood gases or pulse  oximetry may be indicated to confirm the need for supplemental oxygen. Sleeping pills may increase the severity of  untreated DANIEL and their use should be reevaluated once the DANIEL is treated.      Dear Bhargav Yoder MD  [address] [address]  [place] [address]/Chaparrita Kitchen MD         The sleep study that you ordered is complete.  You have ordered sleep LAB services to perform the sleep study for Jose Patterson .      Please find Sleep Study result in  the "Media tab" of Chart Review menu.        You can look  for the report in the  Media by the document type "Sleep Study Documents". Alphabetizing  "Document type" column helps to find the SLEEP STUDY report  Faster.       As the ordering provider, you are responsible for reviewing the results and implementing a treatment plan with your patient.    If you need a Sleep Medicine provider to explain the sleep study findings and arrange treatment for the patient, please refer patient for consultation to our Sleep Clinic via [facility] with Ambulatory Consult Sleep.     To do that please place an order for an  "Ambulatory Consult Sleep" -  order , it will go to our clinic work queue for our staff  to contact the patient for an appointment.      For any questions, please contact our sleep lab  staff at [phone] to talk to clinical staff          Maximo Reyes MD   "

## 2023-03-07 ENCOUNTER — PATIENT MESSAGE (OUTPATIENT)
Dept: SURGERY | Facility: HOSPITAL | Age: 28
End: 2023-03-07
Payer: COMMERCIAL

## 2023-03-09 ENCOUNTER — ANESTHESIA EVENT (OUTPATIENT)
Dept: SURGERY | Facility: HOSPITAL | Age: 28
End: 2023-03-09
Payer: COMMERCIAL

## 2023-03-09 ENCOUNTER — TELEPHONE (OUTPATIENT)
Dept: PREADMISSION TESTING | Facility: HOSPITAL | Age: 28
End: 2023-03-09
Payer: COMMERCIAL

## 2023-03-09 NOTE — ANESTHESIA PREPROCEDURE EVALUATION
03/09/2023  Jose Patterson is a 28 y.o., male.    History reviewed. No pertinent past medical history.    Past Surgical History:   Procedure Laterality Date    TONSILLECTOMY      About 20 years ago?    WISDOM TOOTH EXTRACTION Bilateral        Pre-op Assessment    I have reviewed the Patient Summary Reports.     I have reviewed the Nursing Notes. I have reviewed the NPO Status.   I have reviewed the Medications.     Review of Systems  Anesthesia Hx:  No problems with previous Anesthesia  Denies Family Hx of Anesthesia complications.   Denies Personal Hx of Anesthesia complications.   Social:  Non-Smoker    Hematology/Oncology:  Hematology Normal        Cardiovascular:  Cardiovascular Normal     Pulmonary:  Pulmonary Normal    Renal/:  Renal/ Normal     Hepatic/GI:  Hepatic/GI Normal    Neurological:  Neurology Normal    Psych:   anxiety depression          Physical Exam  General: Alert and Oriented    Airway:  Mallampati: II   Mouth Opening: Normal  TM Distance: Normal  Tongue: Normal  Neck ROM: Normal ROM    Dental:  Intact, Caps / Implants, Retainer    Chest/Lungs:  Clear to auscultation, Normal Respiratory Rate    Heart:  Rate: Normal  Rhythm: Regular Rhythm        Anesthesia Plan  Type of Anesthesia, risks & benefits discussed:    Anesthesia Type: Gen ETT  Intra-op Monitoring Plan: Standard ASA Monitors  Post Op Pain Control Plan: multimodal analgesia and IV/PO Opioids PRN  Induction:  IV  Airway Plan: Direct  ASA Score: 2  Day of Surgery Review of History & Physical: H&P Update referred to the surgeon/provider.    Ready For Surgery From Anesthesia Perspective.     .

## 2023-03-09 NOTE — TELEPHONE ENCOUNTER
Called and spoke with the PATIENT about the following:     Your Surgery arrival time is at 10 on 3.10.2023 at [facility] location.   The address is [address] [place]  [address].      Only one adult (over 18) is to accompany you to surgery, unless it is a Pediatric patient, then 2 adults are encouraged to accompany them to the surgery center.     Your ride MUST STAY the entire time until you are discharged.      Please come to the main lobby and be prepared to show your photo ID and insurance card.      Nothing to eat or drink after midnight, unless you were instructed to take specific medications discussed with the Pre-admit Nurse.      Please call with any questions or concerns.     [phone]      Thanks.

## 2023-03-10 ENCOUNTER — ANESTHESIA (OUTPATIENT)
Dept: SURGERY | Facility: HOSPITAL | Age: 28
End: 2023-03-10
Payer: COMMERCIAL

## 2023-03-10 ENCOUNTER — HOSPITAL ENCOUNTER (OUTPATIENT)
Facility: HOSPITAL | Age: 28
Discharge: HOME | End: 2023-03-10
Attending: OTOLARYNGOLOGY | Admitting: OTOLARYNGOLOGY
Payer: COMMERCIAL

## 2023-03-10 VITALS
BODY MASS INDEX: 27.66 KG/M2 | HEIGHT: 71 IN | HEART RATE: 53 BPM | WEIGHT: 197.56 LBS | TEMPERATURE: 98 F | DIASTOLIC BLOOD PRESSURE: 98 MMHG | SYSTOLIC BLOOD PRESSURE: 134 MMHG | OXYGEN SATURATION: 100 % | RESPIRATION RATE: 16 BRPM

## 2023-03-10 DIAGNOSIS — J34.2 NASAL SEPTAL DEVIATION: ICD-10-CM

## 2023-03-10 DIAGNOSIS — R06.83 SNORING: Primary | ICD-10-CM

## 2023-03-10 PROCEDURE — 36000706: Performed by: OTOLARYNGOLOGY

## 2023-03-10 PROCEDURE — 37000008 HC ANESTHESIA 1ST 15 MINUTES: Performed by: OTOLARYNGOLOGY

## 2023-03-10 PROCEDURE — 30520 REPAIR OF NASAL SEPTUM: CPT | Mod: ,,, | Performed by: OTOLARYNGOLOGY

## 2023-03-10 PROCEDURE — 25000003 PHARM REV CODE 250: Performed by: NURSE ANESTHETIST, CERTIFIED REGISTERED

## 2023-03-10 PROCEDURE — 71000033 HC RECOVERY, INTIAL HOUR: Performed by: OTOLARYNGOLOGY

## 2023-03-10 PROCEDURE — 30520 PR REPAIR, NASAL SEPTUM: ICD-10-PCS | Mod: ,,, | Performed by: OTOLARYNGOLOGY

## 2023-03-10 PROCEDURE — 30140 RESECT INFERIOR TURBINATE: CPT | Mod: 50,51,, | Performed by: OTOLARYNGOLOGY

## 2023-03-10 PROCEDURE — 71000015 HC POSTOP RECOV 1ST HR: Performed by: OTOLARYNGOLOGY

## 2023-03-10 PROCEDURE — D9220A PRA ANESTHESIA: Mod: ,,, | Performed by: NURSE ANESTHETIST, CERTIFIED REGISTERED

## 2023-03-10 PROCEDURE — 36000707: Performed by: OTOLARYNGOLOGY

## 2023-03-10 PROCEDURE — 25000003 PHARM REV CODE 250: Performed by: ANESTHESIOLOGY

## 2023-03-10 PROCEDURE — 63600175 PHARM REV CODE 636 W HCPCS: Performed by: NURSE ANESTHETIST, CERTIFIED REGISTERED

## 2023-03-10 PROCEDURE — D9220A PRA ANESTHESIA: ICD-10-PCS | Mod: ,,, | Performed by: NURSE ANESTHETIST, CERTIFIED REGISTERED

## 2023-03-10 PROCEDURE — 30140 PR EXCISION TURBINATE,SUBMUCOUS: ICD-10-PCS | Mod: 50,51,, | Performed by: OTOLARYNGOLOGY

## 2023-03-10 PROCEDURE — 27201423 OPTIME MED/SURG SUP & DEVICES STERILE SUPPLY: Performed by: OTOLARYNGOLOGY

## 2023-03-10 PROCEDURE — 25000003 PHARM REV CODE 250: Performed by: OTOLARYNGOLOGY

## 2023-03-10 PROCEDURE — 37000009 HC ANESTHESIA EA ADD 15 MINS: Performed by: OTOLARYNGOLOGY

## 2023-03-10 RX ORDER — ACETAMINOPHEN 10 MG/ML
INJECTION, SOLUTION INTRAVENOUS
Status: DISCONTINUED | OUTPATIENT
Start: 2023-03-10 | End: 2023-03-10

## 2023-03-10 RX ORDER — DIPHENHYDRAMINE HYDROCHLORIDE 50 MG/ML
25 INJECTION INTRAMUSCULAR; INTRAVENOUS EVERY 6 HOURS PRN
Status: DISCONTINUED | OUTPATIENT
Start: 2023-03-10 | End: 2023-03-10 | Stop reason: HOSPADM

## 2023-03-10 RX ORDER — HYDROCODONE BITARTRATE AND ACETAMINOPHEN 5; 325 MG/1; MG/1
1 TABLET ORAL
Status: DISCONTINUED | OUTPATIENT
Start: 2023-03-10 | End: 2023-03-10 | Stop reason: HOSPADM

## 2023-03-10 RX ORDER — FENTANYL CITRATE 50 UG/ML
INJECTION, SOLUTION INTRAMUSCULAR; INTRAVENOUS
Status: DISCONTINUED | OUTPATIENT
Start: 2023-03-10 | End: 2023-03-10

## 2023-03-10 RX ORDER — OXYMETAZOLINE HCL 0.05 %
SPRAY, NON-AEROSOL (ML) NASAL
Status: DISCONTINUED
Start: 2023-03-10 | End: 2023-03-10 | Stop reason: HOSPADM

## 2023-03-10 RX ORDER — BACITRACIN ZINC 500 UNIT/G
OINTMENT (GRAM) TOPICAL
Status: DISCONTINUED
Start: 2023-03-10 | End: 2023-03-10 | Stop reason: HOSPADM

## 2023-03-10 RX ORDER — FENTANYL CITRATE 50 UG/ML
25 INJECTION, SOLUTION INTRAMUSCULAR; INTRAVENOUS EVERY 5 MIN PRN
Status: DISCONTINUED | OUTPATIENT
Start: 2023-03-10 | End: 2023-03-10 | Stop reason: HOSPADM

## 2023-03-10 RX ORDER — DEXMEDETOMIDINE HYDROCHLORIDE 100 UG/ML
INJECTION, SOLUTION INTRAVENOUS
Status: DISCONTINUED | OUTPATIENT
Start: 2023-03-10 | End: 2023-03-10

## 2023-03-10 RX ORDER — SUCCINYLCHOLINE CHLORIDE 20 MG/ML
INJECTION INTRAMUSCULAR; INTRAVENOUS
Status: DISCONTINUED | OUTPATIENT
Start: 2023-03-10 | End: 2023-03-10

## 2023-03-10 RX ORDER — PROPOFOL 10 MG/ML
VIAL (ML) INTRAVENOUS
Status: DISCONTINUED | OUTPATIENT
Start: 2023-03-10 | End: 2023-03-10

## 2023-03-10 RX ORDER — LIDOCAINE HYDROCHLORIDE AND EPINEPHRINE 10; 10 MG/ML; UG/ML
INJECTION, SOLUTION INFILTRATION; PERINEURAL
Status: DISCONTINUED | OUTPATIENT
Start: 2023-03-10 | End: 2023-03-10 | Stop reason: HOSPADM

## 2023-03-10 RX ORDER — OXYMETAZOLINE HCL 0.05 %
SPRAY, NON-AEROSOL (ML) NASAL
Status: DISCONTINUED | OUTPATIENT
Start: 2023-03-10 | End: 2023-03-10 | Stop reason: HOSPADM

## 2023-03-10 RX ORDER — DEXAMETHASONE SODIUM PHOSPHATE 4 MG/ML
INJECTION, SOLUTION INTRA-ARTICULAR; INTRALESIONAL; INTRAMUSCULAR; INTRAVENOUS; SOFT TISSUE
Status: DISCONTINUED | OUTPATIENT
Start: 2023-03-10 | End: 2023-03-10

## 2023-03-10 RX ORDER — ROCURONIUM BROMIDE 10 MG/ML
INJECTION, SOLUTION INTRAVENOUS
Status: DISCONTINUED | OUTPATIENT
Start: 2023-03-10 | End: 2023-03-10

## 2023-03-10 RX ORDER — CEPHALEXIN 500 MG/1
500 CAPSULE ORAL EVERY 8 HOURS
Qty: 21 CAPSULE | Refills: 0 | Status: SHIPPED | OUTPATIENT
Start: 2023-03-10 | End: 2023-03-17

## 2023-03-10 RX ORDER — BACITRACIN ZINC 500 UNIT/G
OINTMENT (GRAM) TOPICAL
Status: DISCONTINUED | OUTPATIENT
Start: 2023-03-10 | End: 2023-03-10 | Stop reason: HOSPADM

## 2023-03-10 RX ORDER — MIDAZOLAM HYDROCHLORIDE 1 MG/ML
INJECTION INTRAMUSCULAR; INTRAVENOUS
Status: DISCONTINUED | OUTPATIENT
Start: 2023-03-10 | End: 2023-03-10

## 2023-03-10 RX ORDER — NEOSTIGMINE METHYLSULFATE 1 MG/ML
INJECTION, SOLUTION INTRAVENOUS
Status: DISCONTINUED | OUTPATIENT
Start: 2023-03-10 | End: 2023-03-10

## 2023-03-10 RX ORDER — ONDANSETRON 2 MG/ML
INJECTION INTRAMUSCULAR; INTRAVENOUS
Status: DISCONTINUED | OUTPATIENT
Start: 2023-03-10 | End: 2023-03-10

## 2023-03-10 RX ORDER — HYDROCODONE BITARTRATE AND ACETAMINOPHEN 7.5; 325 MG/1; MG/1
1 TABLET ORAL EVERY 6 HOURS PRN
Qty: 12 TABLET | Refills: 0 | Status: SHIPPED | OUTPATIENT
Start: 2023-03-10 | End: 2023-03-20

## 2023-03-10 RX ORDER — LIDOCAINE HYDROCHLORIDE 20 MG/ML
INJECTION, SOLUTION EPIDURAL; INFILTRATION; INTRACAUDAL; PERINEURAL
Status: DISCONTINUED | OUTPATIENT
Start: 2023-03-10 | End: 2023-03-10

## 2023-03-10 RX ORDER — ONDANSETRON 2 MG/ML
4 INJECTION INTRAMUSCULAR; INTRAVENOUS ONCE AS NEEDED
Status: DISCONTINUED | OUTPATIENT
Start: 2023-03-10 | End: 2023-03-10 | Stop reason: HOSPADM

## 2023-03-10 RX ORDER — MEPERIDINE HYDROCHLORIDE 25 MG/ML
12.5 INJECTION INTRAMUSCULAR; INTRAVENOUS; SUBCUTANEOUS ONCE
Status: DISCONTINUED | OUTPATIENT
Start: 2023-03-10 | End: 2023-03-10 | Stop reason: HOSPADM

## 2023-03-10 RX ADMIN — ACETAMINOPHEN 1000 MG: 10 INJECTION, SOLUTION INTRAVENOUS at 11:03

## 2023-03-10 RX ADMIN — PROPOFOL 200 MG: 10 INJECTION, EMULSION INTRAVENOUS at 11:03

## 2023-03-10 RX ADMIN — NEOSTIGMINE METHYLSULFATE 4 MG: 1 INJECTION INTRAVENOUS at 12:03

## 2023-03-10 RX ADMIN — ROCURONIUM BROMIDE 5 MG: 10 INJECTION, SOLUTION INTRAVENOUS at 11:03

## 2023-03-10 RX ADMIN — HYDROCODONE BITARTRATE AND ACETAMINOPHEN 1 TABLET: 5; 325 TABLET ORAL at 01:03

## 2023-03-10 RX ADMIN — DEXMEDETOMIDINE HYDROCHLORIDE 4 MCG: 100 INJECTION, SOLUTION INTRAVENOUS at 11:03

## 2023-03-10 RX ADMIN — DEXTROSE 2 G: 50 INJECTION, SOLUTION INTRAVENOUS at 11:03

## 2023-03-10 RX ADMIN — DEXMEDETOMIDINE HYDROCHLORIDE 8 MCG: 100 INJECTION, SOLUTION INTRAVENOUS at 12:03

## 2023-03-10 RX ADMIN — DEXAMETHASONE SODIUM PHOSPHATE 12 MG: 4 INJECTION, SOLUTION INTRA-ARTICULAR; INTRALESIONAL; INTRAMUSCULAR; INTRAVENOUS; SOFT TISSUE at 11:03

## 2023-03-10 RX ADMIN — MIDAZOLAM HYDROCHLORIDE 2 MG: 1 INJECTION INTRAMUSCULAR; INTRAVENOUS at 11:03

## 2023-03-10 RX ADMIN — LIDOCAINE HYDROCHLORIDE 100 MG: 20 INJECTION, SOLUTION EPIDURAL; INFILTRATION; INTRACAUDAL; PERINEURAL at 11:03

## 2023-03-10 RX ADMIN — SUCCINYLCHOLINE CHLORIDE 140 MG: 20 INJECTION, SOLUTION INTRAMUSCULAR; INTRAVENOUS; PARENTERAL at 11:03

## 2023-03-10 RX ADMIN — GLYCOPYRROLATE 0.6 MG: 0.2 INJECTION, SOLUTION INTRAMUSCULAR; INTRAVITREAL at 12:03

## 2023-03-10 RX ADMIN — ONDANSETRON 4 MG: 2 INJECTION INTRAMUSCULAR; INTRAVENOUS at 12:03

## 2023-03-10 RX ADMIN — FENTANYL CITRATE 100 MCG: 50 INJECTION, SOLUTION INTRAMUSCULAR; INTRAVENOUS at 11:03

## 2023-03-10 RX ADMIN — FENTANYL CITRATE 25 MCG: 50 INJECTION, SOLUTION INTRAMUSCULAR; INTRAVENOUS at 12:03

## 2023-03-10 NOTE — OP NOTE
Date of Surgery: 3/10/23  SURGEON:  Dr. Bhargav Yoder    Preoperative Diagnosis: 1.  Septal deviation             2.  Nasal obstruction             3.  Inferior turbinate hypertrophy    Postoperative Diagnosis:  Same    Procedure:    1.  Septoplasty    2.  Submucous resection of the inferior turbinates with outfracture    Findings:   1. Deviated nasal septum to the right    2.  Hypertrophic interior turbinates    Anesthesia:  General endotracheal anesthesia    Blood loss:  10 ml    Indications for procedure:   Patient present to ENT clinic with complaints of chronic nasal obstruction and restriction of breathing despite treatment with medical therapy.  On examination, the patient was found to have an significant septal deviation as the cause of his nasal obstruction.  Risks and benefits of the procedure were discussed in detail, and the patient elected to proceed with the procedure.    Procedure in detail:  After appropriate consents were obtained, the patient was taken to the Operating Room and placed on the operating table in a supine position.  After anesthesia intubated the patent, several Afrin soaked pledgets were then placed in the nasal cavity to decongest the nasal mucosa.  Upon examination, there was a significant septal deviation to the right.    The patient's face was then draped and approximately 2 cc of 1% lidocaine with epinephrine was injected into the septum bilaterally in the submucoperichondrial plane.  A 15 blade was then used to make a vertical incision approximately 1 cm posterior to the caudal edge of the septum on the left.  The mucoperichondrial flap was then elevated first on the left, and then the incision was carried through the septum to the right and the right flap was elevated in a similar fashion.  Using a swivel knife as well as double action forceps, the deviated portion of the septum was removed.      Next, the head of each inferior turbinate was injected with 0.5 cc of 1% lidocaine  with epinephrine and a 15 blade was used to make a small incision at the head of the turbinate.  A Irineo elevator was used to elevate the erectile tissue of the inferior turbinate.  The microdebrider with the turbinate blade was then used to remove the erectile tissue in the submucous plane.  A Alvarado elevator was then placed in the nasal cavity, first the left then the right, parallel to the inferior turbinate and the turbinate was outfractured with gentle pressure.    The anterior septal incision was then closed using a chromic suture, and the septal flaps were re-approximated with a 4-0 plain gut suture on a keara needle.  Blanka bivalve splints were then placed along the septum and secured anteriorly using a 2-0 silk suture.     The patient tolerated the procedure well, and was awakened by Anesthesia without difficulty and brought to the recovery room in good condition.

## 2023-03-10 NOTE — TRANSFER OF CARE
"Anesthesia Transfer of Care Note    Patient: Jose Patterson    Procedure(s) Performed: Procedure(s) (LRB):  SEPTOPLASTY, NOSE, WITH NASAL TURBINATE REDUCTION (N/A)    Patient location: PACU    Anesthesia Type: general    Transport from OR: Transported from OR on room air with adequate spontaneous ventilation    Post pain: adequate analgesia    Post assessment: no apparent anesthetic complications    Post vital signs: stable    Level of consciousness: sedated    Nausea/Vomiting: no nausea/vomiting    Complications: none    Transfer of care protocol was followed      Last vitals:   Visit Vitals  BP (!) 135/93 (BP Location: Right arm, Patient Position: Sitting)   Pulse 68   Temp 36.3 °C (97.3 °F) (Temporal)   Resp 18   Ht 5' 11" (1.803 m)   Wt 89.6 kg (197 lb 8.5 oz)   SpO2 99%   BMI 27.55 kg/m²     "

## 2023-03-10 NOTE — DISCHARGE INSTRUCTIONS
DEPARTMENT OF OTOLARYNGOLOGY, HEAD AND NECK SURGERY     MD Jane Washington MD Duncan Hanby, MD Amy Rabalais, MD John Wood, MD     CONTACT   PHONE:   [phone] [address] [place] [address]           Patient Instructions After Nasal and/or Sinus Surgery      What to expect after surgery:     Pain/Sore throat:  This is normal after sinus surgery, take your prescribed pain medication as needed.     Fever:  This may happen during the first 1-2 days after surgery.  If you have a temperature greater than 101 that does not respond to treatment with your oral pain medication/Tylenol, notify your MD     Facial swelling/nasal congestion:  It is common to have some swelling of your nose and face after nasal/sinus surgery.  You also will likely have splints and/or packing in your nose that will make your nasal breathing more difficult. Your physician will remove these splints at your postoperative visit.    Bloody drainage:  Patients having nasal and/or sinus surgery typically will have some bloody drainage from their nose for 2-3 days after surgery.  If you notice significant or bright red bleeding, contact your physician.     Diet:     In general, patients can resume a normal diet after nasal and/or sinus surgery          Activity:     Avoid any strenuous activity, sports, heavy lifting     Anything that raises your blood pressure significantly can increase your chance of bleeding after surgery.     No nose blowing, picking your nose and avoid any nasal trauma     Cough/sneeze only with mouth open     Elevate the head of your bed about 20-30 degrees for the first week after surgery     Start saline irrigation on the afternoon after your surgery, you can irrigate every 2-3 hours and also as needed.  This will help keep your splints and/or nasal passages more clear.     Wait until advised by your surgeon to start or restart any other nasal sprays.     Medication:     Pain  medication is often necessary after this surgery.  Depending on your age, your physician may or may not have prescribed a pain medication.     You can alternate your prescription pain medicine and ibuprofen every 3 hours as needed for pain.     It is VERY important that you do not take your prescription pain medicine AND additional Tylenol since your prescription pain medicine already has Tylenol in it.     Do not drive or operate heavy machinery while on narcotic (prescription) pain medication     Do not take more than 4000mg of Tylenol in 24 hours as this can have SEVERE health consequences including liver failure and death.        Reasons to Call your surgeon:     Persistent fever of 101.5 or higher   Severe pain that has increased greatly since the surgery or is uncontrolled by your prescription pain medication.   If you have significant amounts of bleeding, you should contact your physician and make arrangements to be evaluated in the emergency room.   Persistent nausea and/or vomiting   Chest pain or persistent shortness of breath   Any other significant concerns

## 2023-03-10 NOTE — ANESTHESIA PROCEDURE NOTES
Intubation    Date/Time: 3/10/2023 11:08 AM  Performed by: Allyn Davenport CRNA  Authorized by: Sharmaine Cruz MD     Intubation:     Induction:  Intravenous    Intubated:  Postinduction    Mask Ventilation:  Easy mask    Attempts:  1    Attempted By:  CRNA    Method of Intubation:  Video laryngoscopy    Blade:  Muhammad 3    Laryngeal View Grade: Grade I - full view of cords      Difficult Airway Encountered?: No      Complications:  None    Airway Device:  Oral endotracheal tube    Airway Device Size:  7.5    Style/Cuff Inflation:  Cuffed (inflated to minimal occlusive pressure)    Inflation Amount (mL):  9    Tube secured:  22    Secured at:  The lips    Placement Verified By:  Capnometry    Complicating Factors:  Anterior larynx    Findings Post-Intubation:  BS equal bilateral and atraumatic/condition of teeth unchanged

## 2023-03-10 NOTE — BRIEF OP NOTE
[facility]  Brief Operative Note     SUMMARY     Surgery Date: 3/10/2023     Surgeon(s) and Role:     * Bhargav Yoder MD - Primary    Assisting Surgeon: None    Pre-op Diagnosis:  Nasal septal deviation [J34.2]    Post-op Diagnosis:  Post-Op Diagnosis Codes:     * Nasal septal deviation [J34.2]    Procedure(s) (LRB):  SEPTOPLASTY, NOSE, WITH NASAL TURBINATE REDUCTION (N/A)    Anesthesia: General    Findings/Key Components:  septal deviation right 3+, turbinate hypertrophy     Estimated Blood Loss: 20 ml         Specimens:   Specimen (24h ago, onward)      None            Discharge Note    SUMMARY     Admit Date: 3/10/2023    Discharge Date and Time: No discharge date for patient encounter.    Attending Physician: Bhargav Yoder MD     Discharge Provider: Bhargav Yoder    Final Diagnosis: Post-Op Diagnosis Codes:     * Nasal septal deviation [J34.2]    Disposition: Home or Self Care, discharged in good condition    Follow Up/Patient Instructions:       Medications:  Reconciled Home Medications:   Current Discharge Medication List        CONTINUE these medications which have NOT CHANGED    Details   dextroamphetamine-amphetamine (ADDERALL XR) 15 MG 24 hr capsule Take 1 capsule (15 mg total) by mouth once daily.  Qty: 30 capsule, Refills: 0    Comments: Okay to substitute name brand pending availability  Associated Diagnoses: Concentration deficit      FLUoxetine 20 MG capsule Take 1 capsule (20 mg total) by mouth once daily.  Qty: 90 capsule, Refills: 0    Associated Diagnoses: Anxiety and depression      traZODone (DESYREL) 150 MG tablet Take 1 tablet (150 mg total) by mouth every evening.  Qty: 90 tablet, Refills: 1    Associated Diagnoses: Insomnia, unspecified type      valACYclovir (VALTREX) 1000 MG tablet Take 1 tablet (1,000 mg total) by mouth 2 (two) times daily. Take for 2 days when having an outbreak  Qty: 60 tablet, Refills: 1    Associated Diagnoses: Cold sore           No discharge procedures on  file.

## 2023-03-10 NOTE — ANESTHESIA POSTPROCEDURE EVALUATION
Anesthesia Post Evaluation    Patient: Jose Patterson    Procedure(s) Performed: Procedure(s) (LRB):  SEPTOPLASTY, NOSE, WITH NASAL TURBINATE REDUCTION (N/A)    Final Anesthesia Type: general      Patient location during evaluation: PACU  Patient participation: Yes- Able to Participate  Level of consciousness: awake and alert and oriented  Post-procedure vital signs: reviewed and stable  Pain management: adequate  Airway patency: patent    PONV status at discharge: No PONV  Anesthetic complications: no      Cardiovascular status: blood pressure returned to baseline, stable and hemodynamically stable  Respiratory status: unassisted  Hydration status: euvolemic  Follow-up not needed.          Vitals Value Taken Time   /98 03/10/23 1340   Temp 36.5 °C (97.7 °F) 03/10/23 1237   Pulse 55 03/10/23 1341   Resp 16 03/10/23 1341   SpO2 99 % 03/10/23 1341   Vitals shown include unvalidated device data.      Event Time   Out of Recovery 12:52:00         Pain/Cece Score: Pain Rating Prior to Med Admin: 4 (3/10/2023  1:18 PM)  Cece Score: 10 (3/10/2023  1:45 PM)

## 2023-03-10 NOTE — H&P
Referring Provider:    Chaparrita Kitchen Md  [address] [facility]  [place]  [place] [address]  Subjective:   Patient: Jose Patterson [id number], :1995                            Visit date:2023 2:29 PM     Chief Complaint:  deviated septum (Pt states was hit in face with baseball in . C/o congestion bilaterally delmy at night. Wife hears him gasp for air while sleeping)     HPI:    Prior notes reviewed by myself.  Clinical documentation obtained by nursing staff reviewed.      27-year-old gentleman presents for evaluation of nasal obstruction, right greater than left.  This has been an issue since  at which time he had blunt nasal trauma from a baseball.  He has tried topical nasal steroid sprays for up to 6 weeks at a time without significant relief.  He has also tried saline nasal spray for 6-12 weeks without significant relief.  He reports loud snoring with witnessed apneic episodes and daytime hypersomnolence.  No history of recurrent acute sinusitis.        Objective:      Physical Exam:  Vitals:  BP (!) 150/88   Pulse 106   Temp 98.2 °F (36.8 °C) (Temporal)   Wt 90.7 kg (199 lb 15.3 oz)   General appearance:  Well developed, well nourished     Ears:  Otoscopy of external auditory canals and tympanic membranes was normal, clinical speech reception thresholds grossly intact, no mass/lesion of auricle.     Nose:  No masses/lesions of external nose, nasal mucosa, septum 3+ deviation to the right with 75% obstruction, and turbinates were 3+ hypertrophied, bony and not responsive to decongestant.     Mouth:  No mass/lesion of lips, teeth, gums, hard/soft palate, tongue, tonsils, or oropharynx.     Neck & Lymphatics:  No cervical lymphadenopathy, no neck mass/crepitus/ asymmetry, trachea is midline, no thyroid enlargement/tenderness/mass.           [x]  Data Reviewed:           Lab Results   Component Value Date     WBC 6.92 2023     HGB 14.1 2023     HCT 43.3 2023     MCV 91 2023      EOSINOPHIL 2.3 01/03/2023      PROCEDURE NOTE:  Diagnostic nasal endoscopy  Preprocedure diagnosis:  nasal obstruction  Postprocedure diangosis:  Same  Complications:  None  Blood Loss:  None     Procedure in detail:  After verbal consent was obtained, the patient's nasal cavity was anesthesized using topical Tetracaine and Neosynepherine.  A rigid 30 degree endoscope was placed in first the right, then the left nasal cavity.  The patient was noted to have a significant rightward nasal septal deviation with 75% obstruction of his nasal cavity.  He was noted to have hypertrophied inferior turbinates which contributed to the obstruction and did not decrease in size with decongestant.  No purulent drainage or masses seen.  The patient tolerated the procedure well and there were no complications.           Assessment & Plan:   Nasal septal deviation  -     Case Request Operating Room: SEPTOPLASTY, NOSE, WITH NASAL TURBINATE REDUCTION     Acquired deviated nasal septum  Comments:  referring to ENT  Orders:  -     Ambulatory referral/consult to ENT     Hypertrophy of inferior nasal turbinate     Non-seasonal allergic rhinitis, unspecified trigger     Snoring  -     Home Sleep Study; Future           He has significant nasal obstruction right greater than left.  This is due to his nasal septal deviation and turbinate hypertrophy.  He also has a very convincing history for obstructive sleep apnea.  We had a long discussion about surgical options including septoplasty and bilateral submucous resection of the inferior turbinates.  All risks, benefits and alternatives were explained in detail and his written and verbal informed consent were obtained.  He would like to proceed with the surgery in March.    He we also agreed to move forward with a home sleep study to evaluate him for obstructive sleep apnea.    3/10/23 update:  No changes, to OR today for septo/turbs

## 2023-03-16 ENCOUNTER — OFFICE VISIT (OUTPATIENT)
Dept: OTOLARYNGOLOGY | Facility: CLINIC | Age: 28
End: 2023-03-16
Payer: COMMERCIAL

## 2023-03-16 VITALS — WEIGHT: 200.63 LBS | BODY MASS INDEX: 27.98 KG/M2 | TEMPERATURE: 97 F

## 2023-03-16 DIAGNOSIS — J30.89 NON-SEASONAL ALLERGIC RHINITIS, UNSPECIFIED TRIGGER: ICD-10-CM

## 2023-03-16 DIAGNOSIS — R06.83 SNORING: ICD-10-CM

## 2023-03-16 DIAGNOSIS — J34.2 ACQUIRED DEVIATED NASAL SEPTUM: Primary | ICD-10-CM

## 2023-03-16 DIAGNOSIS — J34.3 HYPERTROPHY OF INFERIOR NASAL TURBINATE: ICD-10-CM

## 2023-03-16 PROCEDURE — 99999 PR PBB SHADOW E&M-EST. PATIENT-LVL II: ICD-10-PCS | Mod: PBBFAC,,, | Performed by: OTOLARYNGOLOGY

## 2023-03-16 PROCEDURE — 99024 POSTOP FOLLOW-UP VISIT: CPT | Mod: S$GLB,,, | Performed by: OTOLARYNGOLOGY

## 2023-03-16 PROCEDURE — 3008F PR BODY MASS INDEX (BMI) DOCUMENTED: ICD-10-PCS | Mod: CPTII,S$GLB,, | Performed by: OTOLARYNGOLOGY

## 2023-03-16 PROCEDURE — 99024 PR POST-OP FOLLOW-UP VISIT: ICD-10-PCS | Mod: S$GLB,,, | Performed by: OTOLARYNGOLOGY

## 2023-03-16 PROCEDURE — 99999 PR PBB SHADOW E&M-EST. PATIENT-LVL II: CPT | Mod: PBBFAC,,, | Performed by: OTOLARYNGOLOGY

## 2023-03-16 PROCEDURE — 3008F BODY MASS INDEX DOCD: CPT | Mod: CPTII,S$GLB,, | Performed by: OTOLARYNGOLOGY

## 2023-03-16 NOTE — PROGRESS NOTES
28-year-old gentleman 1 week postop from septoplasty and turbinoplasty.  Has done well with minimal issues.  His pain is well controlled.  His splints were removed today and his septum is midline and his turbinates are reduced.  Is happy with his results and has a widely patent nasal airway.  I instructed him to continue saline nasal spray and he can follow up p.r.n..

## 2023-05-08 ENCOUNTER — OFFICE VISIT (OUTPATIENT)
Dept: INTERNAL MEDICINE | Facility: CLINIC | Age: 28
End: 2023-05-08
Payer: COMMERCIAL

## 2023-05-08 ENCOUNTER — TELEPHONE (OUTPATIENT)
Dept: INTERNAL MEDICINE | Facility: CLINIC | Age: 28
End: 2023-05-08
Payer: COMMERCIAL

## 2023-05-08 DIAGNOSIS — F32.A ANXIETY AND DEPRESSION: Primary | ICD-10-CM

## 2023-05-08 DIAGNOSIS — F41.9 ANXIETY AND DEPRESSION: Primary | ICD-10-CM

## 2023-05-08 PROCEDURE — 99214 OFFICE O/P EST MOD 30 MIN: CPT | Mod: 95,,, | Performed by: FAMILY MEDICINE

## 2023-05-08 PROCEDURE — 1159F PR MEDICATION LIST DOCUMENTED IN MEDICAL RECORD: ICD-10-PCS | Mod: CPTII,95,, | Performed by: FAMILY MEDICINE

## 2023-05-08 PROCEDURE — 1160F PR REVIEW ALL MEDS BY PRESCRIBER/CLIN PHARMACIST DOCUMENTED: ICD-10-PCS | Mod: CPTII,95,, | Performed by: FAMILY MEDICINE

## 2023-05-08 PROCEDURE — 99214 PR OFFICE/OUTPT VISIT, EST, LEVL IV, 30-39 MIN: ICD-10-PCS | Mod: 95,,, | Performed by: FAMILY MEDICINE

## 2023-05-08 PROCEDURE — 1160F RVW MEDS BY RX/DR IN RCRD: CPT | Mod: CPTII,95,, | Performed by: FAMILY MEDICINE

## 2023-05-08 PROCEDURE — 1159F MED LIST DOCD IN RCRD: CPT | Mod: CPTII,95,, | Performed by: FAMILY MEDICINE

## 2023-05-08 NOTE — PATIENT INSTRUCTIONS
Increase to Prozac to 40mg. (Two capsules)   .   Increase trazodone to 1.5 tablets. If needed, can take two tablets.

## 2023-05-08 NOTE — PROGRESS NOTES
The patient location is: [place]  The chief complaint leading to consultation is: Wants to change prozac    Visit type: audiovisual    Face to Face time with patient: 20  26 minutes of total time spent on the encounter, which includes face to face time and non-face to face time preparing to see the patient (eg, review of tests), Obtaining and/or reviewing separately obtained history, Documenting clinical information in the electronic or other health record, Independently interpreting results (not separately reported) and communicating results to the patient/family/caregiver, or Care coordination (not separately reported).         Each patient to whom he or she provides medical services by telemedicine is:  (1) informed of the relationship between the physician and patient and the respective role of any other health care provider with respect to management of the patient; and (2) notified that he or she may decline to receive medical services by telemedicine and may withdraw from such care at any time.    Notes:         Subjective:     Interested in increasing prozac.   Going through relationship and personal stuff.   Not sleeping well and making mistakes at work.   Working with therapist.     Taking trazodone at night.   Some flipflopping between nights and days.     Never started Adderall due to supply chain issues.     Answers submitted by the patient for this visit:  Review of Systems Questionnaire (Submitted on 5/6/2023)  activity change: Yes  unexpected weight change: No  neck pain: No  hearing loss: No  rhinorrhea: No  trouble swallowing: No  eye discharge: No  visual disturbance: No  chest tightness: No  wheezing: No  chest pain: Yes  palpitations: No  blood in stool: No  constipation: No  vomiting: No  diarrhea: No  polydipsia: No  polyuria: No  difficulty urinating: No  urgency: No  hematuria: No  joint swelling: No  arthralgias: Yes  headaches: No  weakness: No  confusion: No  dysphoric mood:  Yes    Objective:   General: no apparent distress, seen on video chat  Respiratory: no coughing, able to talk in complete sentences   Neuro: no focal deficits appreciated on video; AAOx3  Psych: flat affect, no pressured speech; good insight, no SI/HI     Assessment/Plan:   1. Anxiety and depression  Comments:  uncontrolled; increasing dose of Prozac; continue working with counselor; will have close clinical follow up  Overview:  Jan 2023--started on Prozac;    Working with counselor

## 2023-05-10 ENCOUNTER — PATIENT MESSAGE (OUTPATIENT)
Dept: INTERNAL MEDICINE | Facility: CLINIC | Age: 28
End: 2023-05-10
Payer: COMMERCIAL

## 2023-05-15 ENCOUNTER — TELEPHONE (OUTPATIENT)
Dept: INTERNAL MEDICINE | Facility: CLINIC | Age: 28
End: 2023-05-15
Payer: COMMERCIAL

## 2023-05-18 ENCOUNTER — TELEPHONE (OUTPATIENT)
Dept: INTERNAL MEDICINE | Facility: CLINIC | Age: 28
End: 2023-05-18

## 2023-05-18 ENCOUNTER — OFFICE VISIT (OUTPATIENT)
Dept: INTERNAL MEDICINE | Facility: CLINIC | Age: 28
End: 2023-05-18
Payer: COMMERCIAL

## 2023-05-18 DIAGNOSIS — L30.9 DERMATITIS: ICD-10-CM

## 2023-05-18 DIAGNOSIS — F32.2 SEVERE DEPRESSION: Primary | ICD-10-CM

## 2023-05-18 PROCEDURE — 1160F RVW MEDS BY RX/DR IN RCRD: CPT | Mod: CPTII,95,, | Performed by: FAMILY MEDICINE

## 2023-05-18 PROCEDURE — 1159F MED LIST DOCD IN RCRD: CPT | Mod: CPTII,95,, | Performed by: FAMILY MEDICINE

## 2023-05-18 PROCEDURE — 99214 PR OFFICE/OUTPT VISIT, EST, LEVL IV, 30-39 MIN: ICD-10-PCS | Mod: 95,,, | Performed by: FAMILY MEDICINE

## 2023-05-18 PROCEDURE — 1160F PR REVIEW ALL MEDS BY PRESCRIBER/CLIN PHARMACIST DOCUMENTED: ICD-10-PCS | Mod: CPTII,95,, | Performed by: FAMILY MEDICINE

## 2023-05-18 PROCEDURE — 1159F PR MEDICATION LIST DOCUMENTED IN MEDICAL RECORD: ICD-10-PCS | Mod: CPTII,95,, | Performed by: FAMILY MEDICINE

## 2023-05-18 PROCEDURE — 99214 OFFICE O/P EST MOD 30 MIN: CPT | Mod: 95,,, | Performed by: FAMILY MEDICINE

## 2023-05-18 RX ORDER — FLUOXETINE HYDROCHLORIDE 40 MG/1
40 CAPSULE ORAL DAILY
Qty: 90 CAPSULE | Refills: 1 | Status: SHIPPED | OUTPATIENT
Start: 2023-05-18 | End: 2023-08-01 | Stop reason: SDUPTHER

## 2023-05-18 RX ORDER — CARIPRAZINE 1.5 MG/1
1.5 CAPSULE, GELATIN COATED ORAL DAILY
Qty: 90 CAPSULE | Refills: 0 | Status: SHIPPED | OUTPATIENT
Start: 2023-05-18 | End: 2023-05-31 | Stop reason: SDUPTHER
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
## 2023-05-18 NOTE — PATIENT INSTRUCTIONS
.   Apply neosporin to area that is open on chin.   .   Apply topical steroid to area around ear. If no improvement after another two days, apply a topical antifungal.   .   Mood  -continue taking prozac 40mg  -start taking the Vraylar 1.5mg in the morning  -let us know if you have problems picking it up.  You can look online for the  coupon if it is too expensive with your insurance  -if it makes you sleepy take it at night  -can take it with or without food  -Consider looking into EDMR therapy-->this therapy is geared to help patients with PTSD

## 2023-05-31 ENCOUNTER — OFFICE VISIT (OUTPATIENT)
Dept: INTERNAL MEDICINE | Facility: CLINIC | Age: 28
End: 2023-05-31
Payer: COMMERCIAL

## 2023-05-31 ENCOUNTER — HOSPITAL ENCOUNTER (OUTPATIENT)
Dept: RADIOLOGY | Facility: HOSPITAL | Age: 28
Discharge: HOME | End: 2023-05-31
Attending: FAMILY MEDICINE
Payer: COMMERCIAL

## 2023-05-31 ENCOUNTER — PATIENT MESSAGE (OUTPATIENT)
Dept: INTERNAL MEDICINE | Facility: CLINIC | Age: 28
End: 2023-05-31

## 2023-05-31 VITALS
TEMPERATURE: 99 F | OXYGEN SATURATION: 98 % | SYSTOLIC BLOOD PRESSURE: 126 MMHG | BODY MASS INDEX: 28.35 KG/M2 | HEART RATE: 89 BPM | WEIGHT: 203.25 LBS | DIASTOLIC BLOOD PRESSURE: 80 MMHG

## 2023-05-31 DIAGNOSIS — M79.641 RIGHT HAND PAIN: Primary | ICD-10-CM

## 2023-05-31 DIAGNOSIS — F32.2 SEVERE DEPRESSION: ICD-10-CM

## 2023-05-31 DIAGNOSIS — M79.641 RIGHT HAND PAIN: ICD-10-CM

## 2023-05-31 PROCEDURE — 3074F PR MOST RECENT SYSTOLIC BLOOD PRESSURE < 130 MM HG: ICD-10-PCS | Mod: CPTII,S$GLB,, | Performed by: FAMILY MEDICINE

## 2023-05-31 PROCEDURE — 3079F DIAST BP 80-89 MM HG: CPT | Mod: CPTII,S$GLB,, | Performed by: FAMILY MEDICINE

## 2023-05-31 PROCEDURE — 1159F PR MEDICATION LIST DOCUMENTED IN MEDICAL RECORD: ICD-10-PCS | Mod: CPTII,S$GLB,, | Performed by: FAMILY MEDICINE

## 2023-05-31 PROCEDURE — 99214 PR OFFICE/OUTPT VISIT, EST, LEVL IV, 30-39 MIN: ICD-10-PCS | Mod: S$GLB,,, | Performed by: FAMILY MEDICINE

## 2023-05-31 PROCEDURE — 99999 PR PBB SHADOW E&M-EST. PATIENT-LVL IV: ICD-10-PCS | Mod: PBBFAC,,, | Performed by: FAMILY MEDICINE

## 2023-05-31 PROCEDURE — 99999 PR PBB SHADOW E&M-EST. PATIENT-LVL IV: CPT | Mod: PBBFAC,,, | Performed by: FAMILY MEDICINE

## 2023-05-31 PROCEDURE — 1159F MED LIST DOCD IN RCRD: CPT | Mod: CPTII,S$GLB,, | Performed by: FAMILY MEDICINE

## 2023-05-31 PROCEDURE — 99214 OFFICE O/P EST MOD 30 MIN: CPT | Mod: S$GLB,,, | Performed by: FAMILY MEDICINE

## 2023-05-31 PROCEDURE — 73130 XR HAND COMPLETE 3 VIEW RIGHT: ICD-10-PCS | Mod: 26,RT,, | Performed by: STUDENT IN AN ORGANIZED HEALTH CARE EDUCATION/TRAINING PROGRAM

## 2023-05-31 PROCEDURE — 3008F BODY MASS INDEX DOCD: CPT | Mod: CPTII,S$GLB,, | Performed by: FAMILY MEDICINE

## 2023-05-31 PROCEDURE — 73130 X-RAY EXAM OF HAND: CPT | Mod: 26,RT,, | Performed by: STUDENT IN AN ORGANIZED HEALTH CARE EDUCATION/TRAINING PROGRAM

## 2023-05-31 PROCEDURE — 3074F SYST BP LT 130 MM HG: CPT | Mod: CPTII,S$GLB,, | Performed by: FAMILY MEDICINE

## 2023-05-31 PROCEDURE — 73130 X-RAY EXAM OF HAND: CPT | Mod: TC,RT

## 2023-05-31 PROCEDURE — 3008F PR BODY MASS INDEX (BMI) DOCUMENTED: ICD-10-PCS | Mod: CPTII,S$GLB,, | Performed by: FAMILY MEDICINE

## 2023-05-31 PROCEDURE — 3079F PR MOST RECENT DIASTOLIC BLOOD PRESSURE 80-89 MM HG: ICD-10-PCS | Mod: CPTII,S$GLB,, | Performed by: FAMILY MEDICINE

## 2023-05-31 RX ORDER — CARIPRAZINE 1.5 MG/1
1.5 CAPSULE, GELATIN COATED ORAL DAILY
Qty: 90 CAPSULE | Refills: 0 | Status: SHIPPED | OUTPATIENT
Start: 2023-05-31 | End: 2023-08-01 | Stop reason: SDUPTHER

## 2023-05-31 NOTE — ASSESSMENT & PLAN NOTE
Uncontrolled   Has been unable to start Vraylar; will try to have patient  from hospital pharmacy   Continue working with counselor   Recommended EDMR therapy   No SI/HI
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
## 2023-06-14 ENCOUNTER — OFFICE VISIT (OUTPATIENT)
Dept: ORTHOPEDICS | Facility: CLINIC | Age: 28
End: 2023-06-14
Payer: COMMERCIAL

## 2023-06-14 VITALS — BODY MASS INDEX: 28.45 KG/M2 | HEIGHT: 71 IN | WEIGHT: 203.25 LBS

## 2023-06-14 DIAGNOSIS — M20.011 MALLET DEFORMITY OF RIGHT LITTLE FINGER: Primary | ICD-10-CM

## 2023-06-14 DIAGNOSIS — R22.31 LOCALIZED SWELLING OF FINGER OF RIGHT HAND: ICD-10-CM

## 2023-06-14 DIAGNOSIS — M79.641 RIGHT HAND PAIN: ICD-10-CM

## 2023-06-14 PROCEDURE — 1159F MED LIST DOCD IN RCRD: CPT | Mod: CPTII,S$GLB,,

## 2023-06-14 PROCEDURE — 99203 PR OFFICE/OUTPT VISIT, NEW, LEVL III, 30-44 MIN: ICD-10-PCS | Mod: S$GLB,,,

## 2023-06-14 PROCEDURE — 99999 PR PBB SHADOW E&M-EST. PATIENT-LVL III: ICD-10-PCS | Mod: PBBFAC,,,

## 2023-06-14 PROCEDURE — 3008F BODY MASS INDEX DOCD: CPT | Mod: CPTII,S$GLB,,

## 2023-06-14 PROCEDURE — 99203 OFFICE O/P NEW LOW 30 MIN: CPT | Mod: S$GLB,,,

## 2023-06-14 PROCEDURE — 3008F PR BODY MASS INDEX (BMI) DOCUMENTED: ICD-10-PCS | Mod: CPTII,S$GLB,,

## 2023-06-14 PROCEDURE — 99999 PR PBB SHADOW E&M-EST. PATIENT-LVL III: CPT | Mod: PBBFAC,,,

## 2023-06-14 PROCEDURE — 1159F PR MEDICATION LIST DOCUMENTED IN MEDICAL RECORD: ICD-10-PCS | Mod: CPTII,S$GLB,,

## 2023-06-14 NOTE — PROGRESS NOTES
SUBJECTIVE:      Chief Complaint: Pain of the Right Hand    Referring Provider: Chaparrita Kitchen MD     History of Present Illness:  Patient is a 28 y.o. right hand dominant male who presents today with complaints of R hand pain.   Onset of symptoms was February of this year around [place]ardi [place]as. Unsure if he sustained injury or trauma to area. Admits that the area was red and swollen at the time of onset, but that has since improved.  He now will feel a bump in the area that fluctuates in size.  The location of the pain is in the web space between the right middle finger and ring finger. Pain is 4/10, intermittent.  States that the area can be tender to touch and will affect his  sometimes at work.  He works as an .  He will take ibuprofen or Tylenol as needed for pain. He denies numbness and tingling in the hand.   He also has an old small finger mallet injury that happened over 10 years ago.  He did not receive any treatment for it at the time of injury. States he may be interested in surgery to have it fixed.  The patient denies any fevers, chills, N/V, D/C and presents for evaluation.    No past medical history on file.  Past Surgical History:   Procedure Laterality Date    SEPTOPLASTY, NOSE, WITH NASAL TURBINATE REDUCTION N/A 3/10/2023    Procedure: SEPTOPLASTY, NOSE, WITH NASAL TURBINATE REDUCTION;  Surgeon: Bhargav Yoder MD;  Location: [facility];  Service: ENT;  Laterality: N/A;    TONSILLECTOMY      About 20 years ago?    WISDOM TOOTH EXTRACTION Bilateral      Review of patient's allergies indicates:  No Known Allergies  Social History     Social History Narrative    Not on file     Family History   Problem Relation Age of Onset    Diabetes Paternal Grandfather         Type 2    Cancer Paternal Grandmother         Lung and breast         Current Outpatient Medications:     cariprazine (VRAYLAR) 1.5 mg Cap, Take 1 capsule (1.5 mg total) by mouth once daily., Disp: 90 capsule, Rfl: 0    FLUoxetine 40  "MG capsule, Take 1 capsule (40 mg total) by mouth once daily., Disp: 90 capsule, Rfl: 1    traZODone (DESYREL) 150 MG tablet, Take 1 tablet (150 mg total) by mouth every evening., Disp: 90 tablet, Rfl: 1    valACYclovir (VALTREX) 1000 MG tablet, Take 1 tablet (1,000 mg total) by mouth 2 (two) times daily. Take for 2 days when having an outbreak, Disp: 60 tablet, Rfl: 1      Review of Systems:  Constitutional: Negative for chills and fever.   Respiratory: Negative for cough and shortness of breath.    Gastrointestinal: Negative for nausea and vomiting.   Skin: Negative for rash.   Neurological: Negative for dizziness and headaches.   Psychiatric/Behavioral: Negative for depression.   MSK as in HPI     OBJECTIVE:      Vital Signs (Most Recent):  Vitals:    06/14/23 1021   Weight: 92.2 kg (203 lb 4.2 oz)   Height: 5' 11" (1.803 m)     Body mass index is 28.35 kg/m².      Physical Exam:  Constitutional: The patient appears well-developed and well-nourished. No distress.   Skin: No lesions appreciated  Head: Normocephalic and atraumatic.   Nose: Nose normal.   Ears: No deformities seen  Eyes: Conjunctivae and EOM are normal.   Neck: No tracheal deviation present.   Cardiovascular: Normal rate and intact distal pulses.    Pulmonary/Chest: Effort normal. No respiratory distress.   Abdominal: There is no guarding.   Neurological: The patient is alert.   Psychiatric: The patient has a normal mood and affect.     General    Vitals reviewed.            Right Hand/Wrist Exam     Inspection   Scars: Wrist - absent Hand -  absent  Effusion: Wrist - absent Hand -  absent  Bruising:  Hand -  absent  Deformity:  Hand -  deformity (old mallet finger injury to small DIP)    Other     Neuorologic Exam    Median Distribution: normal  Ulnar Distribution: normal  Radial Distribution: normal    Comments:  Mild tenderness to palpation at the webspace between the ring and middle finger.  No nodule or mass appreciated.  Range of motion of " fingers within normal limits  + extensor lag to small finger DIP  No motor or sensory deficits   No signs of infection      Left Hand/Wrist Exam     Other     Sensory Exam  Median Distribution: normal  Ulnar Distribution: normal  Radial Distribution: normal          Vascular Exam       Capillary Refill  Right Hand: normal capillary refill  Left Hand: normal capillary refill        Diagnostic Results:  EXAM:  XR HAND COMPLETE 3 VIEW RIGHT     CLINICAL HISTORY:  Right hand pain.     COMPARISON: None.     TECHNIQUE: 3 views right hand.     FINDINGS:     Bones: Intact.  Joints: Anatomic alignment. No significant arthrosis.  Miscellaneous: None.        Impression:        As above.     Finalized on: 5/31/2023 2:35 PM By:  Alfonso Nieto III, MD  [facility]# [id number]      2023-05-31 14:37:[id number]    [facility]     Imaging - I independently viewed the patient's imaging as well as the radiology report.  Xrays of the patient's right hand demonstrate no evidence of any acute fractures or dislocations or significant degenerative changes.      ASSESSMENT/PLAN:        ICD-10-CM ICD-9-CM   1. Mallet deformity of right little finger  M20.011 736.1   2. Localized swelling of finger of right hand  R22.31 729.81   3. Right hand pain  M79.641 729.5        No orders of the defined types were placed in this encounter.      Plan:     - x-rays reviewed and discussed with patient, show no acute changes  - discussed possible cyst in hand given the pt's hx of a bump that fluctuates in size -- discussed treatment options including observation and further imaging  - patient elects to observe the area at this time -- will notify the office if the area becomes more symptomatic / wishes to undergo further testing and imaging or surgical evaluation  - offered a stax splint for R small mallet finger, pt declines, states that he could not keep it on full time due to his job  - f/u as needed    Should the patient's symptoms worsen, persist, or fail to improve they  should return for reevaluation and I would be happy to see them back anytime.        Samara Alicea PA-C   [facility]     Please be aware that this note has been generated with the assistance of MModal voice-to-text.  Please excuse any spelling or grammatical errors.

## 2023-06-19 ENCOUNTER — OFFICE VISIT (OUTPATIENT)
Dept: INTERNAL MEDICINE | Facility: CLINIC | Age: 28
End: 2023-06-19
Payer: COMMERCIAL

## 2023-06-19 DIAGNOSIS — F32.A ANXIETY AND DEPRESSION: ICD-10-CM

## 2023-06-19 DIAGNOSIS — F32.2 SEVERE DEPRESSION: Primary | ICD-10-CM

## 2023-06-19 DIAGNOSIS — F41.9 ANXIETY AND DEPRESSION: ICD-10-CM

## 2023-06-19 PROCEDURE — 99213 OFFICE O/P EST LOW 20 MIN: CPT | Mod: 95,,, | Performed by: FAMILY MEDICINE

## 2023-06-19 PROCEDURE — 99213 PR OFFICE/OUTPT VISIT, EST, LEVL III, 20-29 MIN: ICD-10-PCS | Mod: 95,,, | Performed by: FAMILY MEDICINE

## 2023-06-19 NOTE — PROGRESS NOTES
The patient location is: [place]   The chief complaint leading to consultation is: F/U on medication adjustments    Visit type: audiovisual    Face to Face time with patient: 10 minutes  13 minutes of total time spent on the encounter, which includes face to face time and non-face to face time preparing to see the patient (eg, review of tests), Obtaining and/or reviewing separately obtained history, Documenting clinical information in the electronic or other health record, Independently interpreting results (not separately reported) and communicating results to the patient/family/caregiver, or Care coordination (not separately reported).         Each patient to whom he or she provides medical services by telemedicine is:  (1) informed of the relationship between the physician and patient and the respective role of any other health care provider with respect to management of the patient; and (2) notified that he or she may decline to receive medical services by telemedicine and may withdraw from such care at any time.  Notes:         Subjective:   Pt had vraylar script sent to [facility].   Had nausea/ vomiting in the morning when he first took it. Had been drinking the night before.     Switched to taking at night because was getting sleeping.   Feels like mood has improved.   Finds things are not bothering him as much as they might have in the past.     Doing couple's therapy. Still doing his own counseling.   Doing well at work. Denies any issues.     Was evaluated by ortho who are just monitoring hand pain at this time.   He states he was not swollen at that the time of the visit.  He says it mainly irritates him when he moves the fingers/ hand from side to side.     Objective:   General: no apparent distress, seen on video chat  Respiratory: no coughing, able to talk in complete sentences   Neuro: no focal deficits appreciated on video; AAOx3  Psych: improved affect, no pressured speech; good insight, no  SI/HI     Assessment/Plan:   1. Severe depression  Overview:  Jan 2023--started on Prozac;  Started on Vraylar 1.5mg May 2023  Working with couples counselor and personal counselor  Has not looked into EDMR yet     Assessment & Plan:  Significant improved on prozac and vraylar; feels stable   Will continue current regimen       2. Anxiety and depression  Overview:  Jan 2023--started on Prozac;    Working with counselor                Answers submitted by the patient for this visit:  Review of Systems Questionnaire (Submitted on 6/19/2023)  activity change: No  unexpected weight change: No  neck pain: No  hearing loss: No  rhinorrhea: No  trouble swallowing: No  eye discharge: No  visual disturbance: No  chest tightness: No  wheezing: No  chest pain: No  palpitations: No  blood in stool: No  constipation: No  vomiting: Yes  diarrhea: No  polydipsia: No  polyuria: No  difficulty urinating: No  urgency: No  hematuria: No  joint swelling: No  arthralgias: Yes  headaches: No  weakness: No  confusion: No  dysphoric mood: Yes

## 2023-08-01 ENCOUNTER — OFFICE VISIT (OUTPATIENT)
Dept: INTERNAL MEDICINE | Facility: CLINIC | Age: 28
End: 2023-08-01
Payer: COMMERCIAL

## 2023-08-01 DIAGNOSIS — F32.2 SEVERE DEPRESSION: ICD-10-CM

## 2023-08-01 DIAGNOSIS — F17.200 TOBACCO DEPENDENCY: Primary | ICD-10-CM

## 2023-08-01 DIAGNOSIS — G47.00 INSOMNIA, UNSPECIFIED TYPE: ICD-10-CM

## 2023-08-01 PROCEDURE — 99214 OFFICE O/P EST MOD 30 MIN: CPT | Mod: 95,,, | Performed by: FAMILY MEDICINE

## 2023-08-01 PROCEDURE — 99214 PR OFFICE/OUTPT VISIT, EST, LEVL IV, 30-39 MIN: ICD-10-PCS | Mod: 95,,, | Performed by: FAMILY MEDICINE

## 2023-08-01 RX ORDER — CARIPRAZINE 1.5 MG/1
1.5 CAPSULE, GELATIN COATED ORAL DAILY
Qty: 90 CAPSULE | Refills: 0 | Status: SHIPPED | OUTPATIENT
Start: 2023-08-01 | End: 2023-09-19 | Stop reason: SDUPTHER

## 2023-08-01 RX ORDER — MICONAZOLE NITRATE 2 %
2 CREAM (GRAM) TOPICAL
Qty: 380 EACH | Refills: 1 | Status: SHIPPED | OUTPATIENT
Start: 2023-08-01 | End: 2023-10-30

## 2023-08-01 RX ORDER — TRAZODONE HYDROCHLORIDE 150 MG/1
150 TABLET ORAL NIGHTLY
Qty: 90 TABLET | Refills: 1 | Status: SHIPPED | OUTPATIENT
Start: 2023-08-01 | End: 2024-03-12

## 2023-08-01 RX ORDER — FLUOXETINE HYDROCHLORIDE 40 MG/1
40 CAPSULE ORAL DAILY
Qty: 90 CAPSULE | Refills: 1 | Status: SHIPPED | OUTPATIENT
Start: 2023-08-01 | End: 2024-01-28

## 2023-08-01 NOTE — PROGRESS NOTES
The patient location is: [place]   The chief complaint leading to consultation is: F/u on mental health    Visit type: audiovisual    Face to Face time with patient: 14 minutes  16 minutes of total time spent on the encounter, which includes face to face time and non-face to face time preparing to see the patient (eg, review of tests), Obtaining and/or reviewing separately obtained history, Documenting clinical information in the electronic or other health record, Independently interpreting results (not separately reported) and communicating results to the patient/family/caregiver, or Care coordination (not separately reported).         Each patient to whom he or she provides medical services by telemedicine is:  (1) informed of the relationship between the physician and patient and the respective role of any other health care provider with respect to management of the patient; and (2) notified that he or she may decline to receive medical services by telemedicine and may withdraw from such care at any time.    Notes:         Subjective:     States he is feeling pretty well. Thinks vraylar is going well.   Not as worried about the little tings.   Sleep has been going well.   No issues with nausea/ vomiting.   Still doing counseling     Having occasional coughing fits for the past month. Worse when he vapes   Sometimes will cough so hard he will vomit.   Has occasional reflux.     Was working this weekend and had a bite/ sting.  Had an allergic reactions.   No issues with breathing or swallowing.         Answers submitted by the patient for this visit:  Review of Systems Questionnaire (Submitted on 8/1/2023)  activity change: No  unexpected weight change: No  neck pain: No  hearing loss: No  rhinorrhea: No  trouble swallowing: No  eye discharge: No  visual disturbance: No  chest tightness: Yes  wheezing: No  chest pain: No  palpitations: No  blood in stool: No  constipation: No  vomiting: Yes  diarrhea:  No  polydipsia: No  polyuria: No  difficulty urinating: No  urgency: No  hematuria: No  joint swelling: No  arthralgias: No  headaches: No  weakness: No  confusion: No  dysphoric mood: No      Objective:   General: no apparent distress, seen on video chat  Respiratory: no coughing, able to talk in complete sentences   Neuro: no focal deficits appreciated on video; AAOx3  Psych: happy affect, no pressured speech; good insight, no SI/HI     Assessment/Plan:   1. Tobacco dependency  Comments:  advised switching to nicotine gum instead of vaping; counseled on how to use it  Orders:  -     nicotine, polacrilex, (NICORETTE) 2 mg Gum; Take 1 each (2 mg total) by mouth as needed (for smokign cravings; no more than 1 ever hour).  Dispense: 380 each; Refill: 1    2. Insomnia, unspecified type  Overview:  denies any flashbacks, working to control underlying mood  Jan 2023--doing well with  trazodone    Assessment & Plan:  Controlled on current dose of Trazodone; will continue     Orders:  -     traZODone (DESYREL) 150 MG tablet; Take 1 tablet (150 mg total) by mouth every evening.  Dispense: 90 tablet; Refill: 1    3. Severe depression  Overview:  Jan 2023--started on Prozac;  Started on Vraylar 1.5mg May 2023  Working with couples counselor and personal counselor  Has not looked into EDMR yet     Assessment & Plan:  Improved with current medications, will continue Vraylar 1.5mg and Prozac 40mg  Continue working with counselors    Orders:  -     FLUoxetine 40 MG capsule; Take 1 capsule (40 mg total) by mouth once daily.  Dispense: 90 capsule; Refill: 1  -     cariprazine (VRAYLAR) 1.5 mg Cap; Take 1 capsule (1.5 mg total) by mouth once daily.  Dispense: 90 capsule; Refill: 0

## 2023-08-01 NOTE — ASSESSMENT & PLAN NOTE
Improved with current medications, will continue Vraylar 1.5mg and Prozac 40mg  Continue working with counselors

## 2023-08-17 ENCOUNTER — PATIENT MESSAGE (OUTPATIENT)
Dept: ADMINISTRATIVE | Facility: HOSPITAL | Age: 28
End: 2023-08-17
Payer: COMMERCIAL

## 2023-08-28 ENCOUNTER — PATIENT OUTREACH (OUTPATIENT)
Dept: ADMINISTRATIVE | Facility: HOSPITAL | Age: 28
End: 2023-08-28
Payer: COMMERCIAL

## 2023-08-28 NOTE — PROGRESS NOTES
Epic CAMPAIGN: per portal response Lipid panel recommended, will send message to Dr Kitchen staff asking what dx code should be used for lipid panel.

## 2023-09-19 DIAGNOSIS — F32.2 SEVERE DEPRESSION: ICD-10-CM

## 2023-09-19 RX ORDER — CARIPRAZINE 1.5 MG/1
1.5 CAPSULE, GELATIN COATED ORAL DAILY
Qty: 90 CAPSULE | Refills: 0 | Status: SHIPPED | OUTPATIENT
Start: 2023-09-19 | End: 2023-12-20

## 2024-03-12 ENCOUNTER — OFFICE VISIT (OUTPATIENT)
Dept: INTERNAL MEDICINE | Facility: CLINIC | Age: 29
End: 2024-03-12
Payer: COMMERCIAL

## 2024-03-12 VITALS
OXYGEN SATURATION: 97 % | WEIGHT: 209.19 LBS | SYSTOLIC BLOOD PRESSURE: 110 MMHG | HEART RATE: 105 BPM | TEMPERATURE: 98 F | BODY MASS INDEX: 29.29 KG/M2 | HEIGHT: 71 IN | DIASTOLIC BLOOD PRESSURE: 80 MMHG

## 2024-03-12 DIAGNOSIS — Z00.00 ROUTINE ADULT HEALTH MAINTENANCE: Primary | ICD-10-CM

## 2024-03-12 DIAGNOSIS — F32.A ANXIETY AND DEPRESSION: ICD-10-CM

## 2024-03-12 DIAGNOSIS — Z11.4 SCREENING FOR HIV WITHOUT PRESENCE OF RISK FACTORS: ICD-10-CM

## 2024-03-12 DIAGNOSIS — Z13.1 SCREENING FOR DIABETES MELLITUS: ICD-10-CM

## 2024-03-12 DIAGNOSIS — Z13.29 THYROID DISORDER SCREENING: ICD-10-CM

## 2024-03-12 DIAGNOSIS — B00.1 COLD SORE: ICD-10-CM

## 2024-03-12 DIAGNOSIS — Z13.220 LIPID SCREENING: ICD-10-CM

## 2024-03-12 DIAGNOSIS — F41.9 ANXIETY AND DEPRESSION: ICD-10-CM

## 2024-03-12 DIAGNOSIS — K62.5 RECTAL BLEEDING: ICD-10-CM

## 2024-03-12 DIAGNOSIS — F33.1 MODERATE EPISODE OF RECURRENT MAJOR DEPRESSIVE DISORDER: ICD-10-CM

## 2024-03-12 DIAGNOSIS — G47.00 INSOMNIA, UNSPECIFIED TYPE: ICD-10-CM

## 2024-03-12 DIAGNOSIS — Z11.59 ENCOUNTER FOR HEPATITIS C SCREENING TEST FOR LOW RISK PATIENT: ICD-10-CM

## 2024-03-12 PROCEDURE — 3079F DIAST BP 80-89 MM HG: CPT | Mod: CPTII,S$GLB,, | Performed by: NURSE PRACTITIONER

## 2024-03-12 PROCEDURE — 3008F BODY MASS INDEX DOCD: CPT | Mod: CPTII,S$GLB,, | Performed by: NURSE PRACTITIONER

## 2024-03-12 PROCEDURE — 99395 PREV VISIT EST AGE 18-39: CPT | Mod: S$GLB,,, | Performed by: NURSE PRACTITIONER

## 2024-03-12 PROCEDURE — 3074F SYST BP LT 130 MM HG: CPT | Mod: CPTII,S$GLB,, | Performed by: NURSE PRACTITIONER

## 2024-03-12 PROCEDURE — 1160F RVW MEDS BY RX/DR IN RCRD: CPT | Mod: CPTII,S$GLB,, | Performed by: NURSE PRACTITIONER

## 2024-03-12 PROCEDURE — 99999 PR PBB SHADOW E&M-EST. PATIENT-LVL IV: CPT | Mod: PBBFAC,,, | Performed by: NURSE PRACTITIONER

## 2024-03-12 PROCEDURE — 1159F MED LIST DOCD IN RCRD: CPT | Mod: CPTII,S$GLB,, | Performed by: NURSE PRACTITIONER

## 2024-03-12 RX ORDER — CARIPRAZINE 1.5 MG/1
CAPSULE, GELATIN COATED ORAL
COMMUNITY
End: 2024-03-12

## 2024-03-12 RX ORDER — VALACYCLOVIR HYDROCHLORIDE 1 G/1
1000 TABLET, FILM COATED ORAL 2 TIMES DAILY
Qty: 60 TABLET | Refills: 1 | Status: CANCELLED | OUTPATIENT
Start: 2024-03-12 | End: 2024-05-11

## 2024-03-12 RX ORDER — VALACYCLOVIR HYDROCHLORIDE 1 G/1
1000 TABLET, FILM COATED ORAL 2 TIMES DAILY
Qty: 60 TABLET | Refills: 1 | Status: SHIPPED | OUTPATIENT
Start: 2024-03-12 | End: 2024-05-11

## 2024-03-12 NOTE — ASSESSMENT & PLAN NOTE
No current medication at this time.  Feels that he is stable without medication except for trazodone p.r.n. for sleep.  Continues to see counselor every 2 weeks.

## 2024-03-12 NOTE — PROGRESS NOTES
Subjective:       Patient ID: Jose Patterson is a 29 y.o. male.    Chief Complaint: Establish Care    Mr. Patterson presents a visit to establish care and for annual wellness exam.  Needs a refill on Valtrex for acute cold sore of lower lip.  Due for eye exam   Due for dental exam   Does exercise by walking 2 miles per day.  Declines pneumococcal and flu vaccine today.  In regards to depression, patient feels that he is stable without medication at this time.  Has not taking Vraylar since last year, and has not taken fluoxetine in over a month.  Continues to see /counselor every 2 weeks.  In ROS, patient reports rectal bleeding intermittently.  Reports having blood with wiping every 4-5 days.  Occasionally has bright red blood in toilet.  Has been going on for years.  Does have intermittent rectal burning during episodes.  Denies any history of hemorrhoids.  No family history of colon cancer.        Patient Active Problem List   Diagnosis    Cold sore    Concentration deficit    Anxiety and depression    Insomnia    Snoring    Moderate episode of recurrent major depressive disorder    Rectal bleeding       Family History   Problem Relation Age of Onset    Diabetes Paternal Grandfather         Type 2    Cancer Paternal Grandmother         Lung and breast     Past Surgical History:   Procedure Laterality Date    SEPTOPLASTY, NOSE, WITH NASAL TURBINATE REDUCTION N/A 3/10/2023    Procedure: SEPTOPLASTY, NOSE, WITH NASAL TURBINATE REDUCTION;  Surgeon: Bhargav Yoder MD;  Location: [facility];  Service: ENT;  Laterality: N/A;    TONSILLECTOMY      About 20 years ago?    WISDOM TOOTH EXTRACTION Bilateral          Current Outpatient Medications:     traZODone (DESYREL) 150 MG tablet, Take 1 tablet (150 mg total) by mouth every evening., Disp: 90 tablet, Rfl: 1    FLUoxetine 40 MG capsule, Take 1 capsule (40 mg total) by mouth once daily., Disp: 90 capsule, Rfl: 1    valACYclovir (VALTREX) 1000 MG tablet, Take 1 tablet  "(1,000 mg total) by mouth 2 (two) times daily. Take for 2 days when having an outbreak, Disp: 60 tablet, Rfl: 1    Review of Systems   Constitutional:  Negative for activity change, appetite change, chills, fatigue and fever.   Eyes:  Negative for visual disturbance.   Respiratory:  Negative for cough and shortness of breath.    Cardiovascular:  Negative for chest pain and palpitations.   Gastrointestinal:  Positive for anal bleeding. Negative for abdominal pain, blood in stool, constipation, diarrhea, nausea and vomiting.   Endocrine: Negative for polydipsia, polyphagia and polyuria.   Genitourinary:  Negative for dysuria and frequency.   Skin:  Positive for wound.        Cold sore, lower lip   Neurological:  Negative for dizziness, light-headedness and headaches.   Psychiatric/Behavioral:  Negative for dysphoric mood, self-injury, sleep disturbance and suicidal ideas. The patient is not nervous/anxious.        Objective:   /80 (BP Location: Left arm, Patient Position: Sitting, BP Method: Medium (Manual))   Pulse 105   Temp 98.3 °F (36.8 °C) (Tympanic)   Ht 5' 11" (1.803 m)   Wt 94.9 kg (209 lb 3.5 oz)   SpO2 97%   BMI 29.18 kg/m²      Physical Exam  Vitals reviewed.   Constitutional:       General: He is not in acute distress.     Appearance: Normal appearance. He is well-developed. He is not ill-appearing or diaphoretic.   HENT:      Head: Normocephalic.      Right Ear: No middle ear effusion. Tympanic membrane is scarred.      Left Ear: Tympanic membrane normal.  No middle ear effusion.      Nose: Nose normal.      Mouth/Throat:      Mouth: Mucous membranes are moist.      Pharynx: Oropharynx is clear. No oropharyngeal exudate or posterior oropharyngeal erythema.   Eyes:      General:         Right eye: No discharge.         Left eye: No discharge.      Extraocular Movements: Extraocular movements intact.      Conjunctiva/sclera: Conjunctivae normal.      Pupils: Pupils are equal, round, and reactive " to light.   Cardiovascular:      Rate and Rhythm: Normal rate and regular rhythm.      Pulses: Normal pulses.      Heart sounds: Normal heart sounds. No murmur heard.     No friction rub. No gallop.   Pulmonary:      Effort: Pulmonary effort is normal. No respiratory distress.      Breath sounds: Normal breath sounds. No rales.   Abdominal:      Palpations: Abdomen is soft.      Tenderness: There is no abdominal tenderness.   Genitourinary:     Rectum: External hemorrhoid present. No tenderness or internal hemorrhoid.   Musculoskeletal:         General: Normal range of motion.      Cervical back: Normal range of motion and neck supple. No tenderness.      Right lower leg: No edema.      Left lower leg: No edema.   Lymphadenopathy:      Cervical: No cervical adenopathy.   Skin:     General: Skin is warm and dry.      Coloration: Skin is not pale.      Findings: No erythema.   Neurological:      Mental Status: He is alert and oriented to person, place, and time.   Psychiatric:         Mood and Affect: Mood normal.         Behavior: Behavior normal.         Assessment & Plan     Problem List Items Addressed This Visit          Psychiatric    Anxiety and depression    Current Assessment & Plan     Stable without medication.  Continues to see counselor every 2 weeks.         Moderate episode of recurrent major depressive disorder    Overview     Jan 2023--started on Prozac;  Started on Vraylar 1.5mg May 2023  Working with couples counselor and personal counselor  Has not looked into EDMR yet          Current Assessment & Plan     No current medication at this time.  Feels that he is stable without medication except for trazodone p.r.n. for sleep.  Continues to see counselor every 2 weeks.            ID    Cold sore    Current Assessment & Plan     Medication refilled.         Relevant Medications    valACYclovir (VALTREX) 1000 MG tablet       GI    Rectal bleeding    Current Assessment & Plan     No active bleeding at  "this time.  External hemorrhoid noted.  Increase fiber in diet   Increase water intake   Stool softeners p.r.n. for constipation.            Other    Insomnia    Current Assessment & Plan     Stable trazodone p.r.n..          Other Visit Diagnoses       Routine adult health maintenance    -  Primary    Relevant Orders    CBC Auto Differential    COMPREHENSIVE METABOLIC PANEL    Lipid Panel    TSH    Hemoglobin A1C    HEPATITIS C ANTIBODY    HIV 1/2 Ag/Ab (4th Gen)    Screening for HIV without presence of risk factors        Relevant Orders    HIV 1/2 Ag/Ab (4th Gen)    Encounter for hepatitis C screening test for low risk patient        Relevant Orders    HEPATITIS C ANTIBODY    Screening for diabetes mellitus        Relevant Orders    Hemoglobin A1C    Lipid screening        Relevant Orders    Lipid Panel    Thyroid disorder screening        Relevant Orders    TSH        No concerns on physical exam.  Routine labs ordered.      Follow up in about 1 year (around 3/12/2025) for annual wellness .            Portions of this note may have been created with voice recognition software. Occasional "wrong-word" or "sound-a-like" substitutions may have occurred due to the inherent limitations of voice recognition software. Please, read the note carefully and recognize, using context, where substitutions have occurred.       "

## 2024-03-12 NOTE — ASSESSMENT & PLAN NOTE
No active bleeding at this time.  External hemorrhoid noted.  Increase fiber in diet   Increase water intake   Stool softeners p.r.n. for constipation.

## 2024-03-18 ENCOUNTER — LAB VISIT (OUTPATIENT)
Dept: LAB | Facility: HOSPITAL | Age: 29
End: 2024-03-18
Attending: NURSE PRACTITIONER
Payer: COMMERCIAL

## 2024-03-18 DIAGNOSIS — Z13.1 SCREENING FOR DIABETES MELLITUS: ICD-10-CM

## 2024-03-18 DIAGNOSIS — Z11.4 SCREENING FOR HIV WITHOUT PRESENCE OF RISK FACTORS: ICD-10-CM

## 2024-03-18 DIAGNOSIS — Z13.29 THYROID DISORDER SCREENING: ICD-10-CM

## 2024-03-18 DIAGNOSIS — Z00.00 ROUTINE ADULT HEALTH MAINTENANCE: ICD-10-CM

## 2024-03-18 DIAGNOSIS — Z13.220 LIPID SCREENING: ICD-10-CM

## 2024-03-18 DIAGNOSIS — Z11.59 ENCOUNTER FOR HEPATITIS C SCREENING TEST FOR LOW RISK PATIENT: ICD-10-CM

## 2024-03-18 LAB
ALBUMIN SERPL BCP-MCNC: 4.4 G/DL (ref 3.5–5.2)
ALP SERPL-CCNC: 42 U/L (ref 55–135)
ALT SERPL W/O P-5'-P-CCNC: 22 U/L (ref 10–44)
ANION GAP SERPL CALC-SCNC: 11 MMOL/L (ref 8–16)
AST SERPL-CCNC: 16 U/L (ref 10–40)
BASOPHILS # BLD AUTO: 0.06 K/UL (ref 0–0.2)
BASOPHILS NFR BLD: 0.9 % (ref 0–1.9)
BILIRUB SERPL-MCNC: 1 MG/DL (ref 0.1–1)
BUN SERPL-MCNC: 16 MG/DL (ref 6–20)
CALCIUM SERPL-MCNC: 9.8 MG/DL (ref 8.7–10.5)
CHLORIDE SERPL-SCNC: 106 MMOL/L (ref 95–110)
CO2 SERPL-SCNC: 25 MMOL/L (ref 23–29)
CREAT SERPL-MCNC: 1.1 MG/DL (ref 0.5–1.4)
DIFFERENTIAL METHOD BLD: NORMAL
EOSINOPHIL # BLD AUTO: 0.2 K/UL (ref 0–0.5)
EOSINOPHIL NFR BLD: 2.3 % (ref 0–8)
ERYTHROCYTE [DISTWIDTH] IN BLOOD BY AUTOMATED COUNT: 11.9 % (ref 11.5–14.5)
EST. GFR  (NO RACE VARIABLE): >60 ML/MIN/1.73 M^2
GLUCOSE SERPL-MCNC: 95 MG/DL (ref 70–110)
HCT VFR BLD AUTO: 50 % (ref 40–54)
HCV AB SERPL QL IA: NEGATIVE
HGB BLD-MCNC: 16.4 G/DL (ref 14–18)
HIV 1+2 AB+HIV1 P24 AG SERPL QL IA: NEGATIVE
IMM GRANULOCYTES # BLD AUTO: 0.02 K/UL (ref 0–0.04)
IMM GRANULOCYTES NFR BLD AUTO: 0.3 % (ref 0–0.5)
LYMPHOCYTES # BLD AUTO: 2.4 K/UL (ref 1–4.8)
LYMPHOCYTES NFR BLD: 36.3 % (ref 18–48)
MCH RBC QN AUTO: 29.2 PG (ref 27–31)
MCHC RBC AUTO-ENTMCNC: 32.8 G/DL (ref 32–36)
MCV RBC AUTO: 89 FL (ref 82–98)
MONOCYTES # BLD AUTO: 0.5 K/UL (ref 0.3–1)
MONOCYTES NFR BLD: 7.7 % (ref 4–15)
NEUTROPHILS # BLD AUTO: 3.5 K/UL (ref 1.8–7.7)
NEUTROPHILS NFR BLD: 52.5 % (ref 38–73)
NRBC BLD-RTO: 0 /100 WBC
PLATELET # BLD AUTO: 282 K/UL (ref 150–450)
PMV BLD AUTO: 10.5 FL (ref 9.2–12.9)
POTASSIUM SERPL-SCNC: 5.3 MMOL/L (ref 3.5–5.1)
PROT SERPL-MCNC: 7.9 G/DL (ref 6–8.4)
RBC # BLD AUTO: 5.62 M/UL (ref 4.6–6.2)
SODIUM SERPL-SCNC: 142 MMOL/L (ref 136–145)
T4 FREE SERPL-MCNC: 1.04 NG/DL (ref 0.71–1.51)
TSH SERPL DL<=0.005 MIU/L-ACNC: 6.08 UIU/ML (ref 0.4–4)
WBC # BLD AUTO: 6.66 K/UL (ref 3.9–12.7)

## 2024-03-18 PROCEDURE — 36415 COLL VENOUS BLD VENIPUNCTURE: CPT | Mod: PO | Performed by: NURSE PRACTITIONER

## 2024-03-18 PROCEDURE — 84443 ASSAY THYROID STIM HORMONE: CPT | Mod: PO | Performed by: NURSE PRACTITIONER

## 2024-03-18 PROCEDURE — 80061 LIPID PANEL: CPT | Performed by: NURSE PRACTITIONER

## 2024-03-18 PROCEDURE — 84439 ASSAY OF FREE THYROXINE: CPT | Mod: PO | Performed by: NURSE PRACTITIONER

## 2024-03-18 PROCEDURE — 85025 COMPLETE CBC W/AUTO DIFF WBC: CPT | Mod: PO | Performed by: NURSE PRACTITIONER

## 2024-03-18 PROCEDURE — 86803 HEPATITIS C AB TEST: CPT | Performed by: NURSE PRACTITIONER

## 2024-03-18 PROCEDURE — 80053 COMPREHEN METABOLIC PANEL: CPT | Mod: PO | Performed by: NURSE PRACTITIONER

## 2024-03-18 PROCEDURE — 87389 HIV-1 AG W/HIV-1&-2 AB AG IA: CPT | Performed by: NURSE PRACTITIONER

## 2024-03-18 PROCEDURE — 83036 HEMOGLOBIN GLYCOSYLATED A1C: CPT | Performed by: NURSE PRACTITIONER

## 2024-03-19 DIAGNOSIS — E87.5 HYPERKALEMIA: Primary | ICD-10-CM

## 2024-03-19 DIAGNOSIS — E03.8 SUBCLINICAL HYPOTHYROIDISM: ICD-10-CM

## 2024-03-19 LAB
CHOLEST SERPL-MCNC: 172 MG/DL (ref 120–199)
CHOLEST/HDLC SERPL: 3.7 {RATIO} (ref 2–5)
ESTIMATED AVG GLUCOSE: 103 MG/DL (ref 68–131)
HBA1C MFR BLD: 5.2 % (ref 4–5.6)
HDLC SERPL-MCNC: 46 MG/DL (ref 40–75)
HDLC SERPL: 26.7 % (ref 20–50)
LDLC SERPL CALC-MCNC: 99.6 MG/DL (ref 63–159)
NONHDLC SERPL-MCNC: 126 MG/DL
TRIGL SERPL-MCNC: 132 MG/DL (ref 30–150)

## 2024-04-12 ENCOUNTER — LAB VISIT (OUTPATIENT)
Dept: LAB | Facility: HOSPITAL | Age: 29
End: 2024-04-12
Attending: NURSE PRACTITIONER
Payer: COMMERCIAL

## 2024-04-12 DIAGNOSIS — E03.8 SUBCLINICAL HYPOTHYROIDISM: ICD-10-CM

## 2024-04-12 LAB — TSH SERPL DL<=0.005 MIU/L-ACNC: 2.32 UIU/ML (ref 0.4–4)

## 2024-04-12 PROCEDURE — 84443 ASSAY THYROID STIM HORMONE: CPT | Mod: PO | Performed by: NURSE PRACTITIONER

## 2024-04-12 PROCEDURE — 36415 COLL VENOUS BLD VENIPUNCTURE: CPT | Mod: PO | Performed by: NURSE PRACTITIONER

## 2024-06-17 PROBLEM — K62.5 RECTAL BLEEDING: Status: RESOLVED | Noted: 2024-03-12 | Resolved: 2024-06-17

## (undated) DEVICE — NDL HYPO 27G X 1 1/2

## (undated) DEVICE — SEE MEDLINE ITEM 157027

## (undated) DEVICE — Device

## (undated) DEVICE — SUT PDSII 4-0 PS-2 CLEAR MO

## (undated) DEVICE — SYR B-D DISP CONTROL 10CC100/C

## (undated) DEVICE — SYR 30CC LUER LOCK

## (undated) DEVICE — SUT 2/0 30IN SILK BLK BRAI

## (undated) DEVICE — COVER CAMERA OPERATING ROOM

## (undated) DEVICE — SPONGE PATTY SURGICAL .5X3IN

## (undated) DEVICE — CORD BIPOLAR 12 FOOT

## (undated) DEVICE — DRESSING TRANS 2X2 TEGADERM

## (undated) DEVICE — TOWEL OR DISP STRL BLUE 4/PK

## (undated) DEVICE — KIT ANTIFOG

## (undated) DEVICE — HANDSWITCH SUCTION COAG

## (undated) DEVICE — COVER LIGHT HANDLE 80/CA

## (undated) DEVICE — DRAPE ORTH SPLIT 77X108IN

## (undated) DEVICE — GLOVE BIOGEL 7.5

## (undated) DEVICE — MANIFOLD 4 PORT

## (undated) DEVICE — SUT VICRYL 4-0 ANTIBACT

## (undated) DEVICE — SUT PLAIN 4-0 SC-1 18IN

## (undated) DEVICE — WASH MEDSPA BABY 2OZ

## (undated) DEVICE — NDL BLUNT W/O FILTER 18GX1.5IN

## (undated) DEVICE — SUT 4-0 CHROMIC GUT / RB1

## (undated) DEVICE — KIT ANTIFOG W/SPONG & FLUID

## (undated) DEVICE — TUBING SUCTION STRAIGHT .25X20

## (undated) DEVICE — BLADE INFERIOR TURBINATE 2MM

## (undated) DEVICE — ELECTRODE REM PLYHSV RETURN 9